# Patient Record
Sex: MALE | Race: BLACK OR AFRICAN AMERICAN | Employment: UNEMPLOYED | URBAN - METROPOLITAN AREA
[De-identification: names, ages, dates, MRNs, and addresses within clinical notes are randomized per-mention and may not be internally consistent; named-entity substitution may affect disease eponyms.]

---

## 2017-01-03 ENCOUNTER — OFFICE VISIT (OUTPATIENT)
Dept: FAMILY MEDICINE | Facility: CLINIC | Age: 67
End: 2017-01-03
Payer: COMMERCIAL

## 2017-01-03 VITALS
WEIGHT: 176.7 LBS | HEIGHT: 67 IN | TEMPERATURE: 97.3 F | OXYGEN SATURATION: 96 % | DIASTOLIC BLOOD PRESSURE: 64 MMHG | HEART RATE: 80 BPM | BODY MASS INDEX: 27.73 KG/M2 | SYSTOLIC BLOOD PRESSURE: 132 MMHG

## 2017-01-03 DIAGNOSIS — N13.9 URINARY (TRACT) OBSTRUCTION: ICD-10-CM

## 2017-01-03 DIAGNOSIS — E11.9 TYPE 2 DIABETES MELLITUS WITHOUT COMPLICATION, WITHOUT LONG-TERM CURRENT USE OF INSULIN (H): ICD-10-CM

## 2017-01-03 DIAGNOSIS — M54.41 ACUTE BILATERAL LOW BACK PAIN WITH RIGHT-SIDED SCIATICA: Primary | ICD-10-CM

## 2017-01-03 LAB
ALBUMIN UR-MCNC: NEGATIVE MG/DL
APPEARANCE UR: CLEAR
BILIRUB UR QL STRIP: NEGATIVE
COLOR UR AUTO: YELLOW
GLUCOSE UR STRIP-MCNC: NEGATIVE MG/DL
HGB UR QL STRIP: ABNORMAL
KETONES UR STRIP-MCNC: NEGATIVE MG/DL
LEUKOCYTE ESTERASE UR QL STRIP: NEGATIVE
NITRATE UR QL: NEGATIVE
PH UR STRIP: 5 PH (ref 5–7)
RBC #/AREA URNS AUTO: NORMAL /HPF (ref 0–2)
SP GR UR STRIP: >1.03 (ref 1–1.03)
URN SPEC COLLECT METH UR: ABNORMAL
UROBILINOGEN UR STRIP-ACNC: 0.2 EU/DL (ref 0.2–1)
WBC #/AREA URNS AUTO: NORMAL /HPF (ref 0–2)

## 2017-01-03 PROCEDURE — T1013 SIGN LANG/ORAL INTERPRETER: HCPCS | Mod: U3 | Performed by: FAMILY MEDICINE

## 2017-01-03 PROCEDURE — 81001 URINALYSIS AUTO W/SCOPE: CPT | Performed by: FAMILY MEDICINE

## 2017-01-03 PROCEDURE — 99214 OFFICE O/P EST MOD 30 MIN: CPT | Performed by: FAMILY MEDICINE

## 2017-01-03 RX ORDER — TAMSULOSIN HYDROCHLORIDE 0.4 MG/1
0.4 CAPSULE ORAL DAILY
Qty: 30 CAPSULE | Refills: 1 | Status: SHIPPED | OUTPATIENT
Start: 2017-01-03

## 2017-01-03 NOTE — MR AVS SNAPSHOT
After Visit Summary   1/3/2017    Abdurahman Waday    MRN: [id number]           Patient Information     Date Of Birth          1950        Visit Information        Provider Department      1/3/2017 3:15 PM Consuelo Villavicencio Lawrence Gerard, MD [facility]        Today's Diagnoses     Acute bilateral low back pain with right-sided sciatica    -  1     Urinary (tract) obstruction            Follow-ups after your visit        Additional Services     PHYSICAL THERAPY REFERRAL       patient wants to go to [facility]YS[facility]   He states that it is covered by his insurance but agrees to check with his insurance carrier to make sure that it is covered     Evaluation and treat 8 visits for Acute bilateral low back pain with right-sided sciatica  (primary encounter diagnosis)  Urinary (tract) obstruction If you have not heard from the scheduling office within 2 business days, please call [phone] for all locations, with the exception of [place], please call [phone].  Treatment: Evaluation & Treatment  Special Instructions/Modalities: evaluation and treat       Please be aware that coverage of these services is subject to the terms and limitations of your health insurance plan.  Call member services at your health plan with any benefit or coverage questions.      **Note to Provider:  If you are referring outside of [facility] for the therapy appointment, please list the name of the location in the  special instructions  above, print the referral and give to the patient to schedule the appointment.            UROLOGY ADULT REFERRAL       Your provider has referred you to: [facility] [place] for Prostate and Urologic C[facility] [phone]   [email or web address]    Please be aware that coverage of these services is subject to the terms and limitations of your health insurance plan.  Call member services  at your health plan with any benefit or coverage questions.      Please bring the following with you to your appointment:    (1) Any X-Rays, CTs or MRIs which have been performed.  Contact the facility where they were done to arrange for  prior to your scheduled appointment.    (2) List of current medications  (3) This referral request   (4) Any documents/labs given to you for this referral                  Your next 10 appointments already scheduled     Jan 16, 2017  9:00 AM   Office Visit with Lev Schwartz MD   [facility] ([facility])    [address] [place] [id number] [phone]           Bring a current list of meds and any records pertaining to this visit.  For Physicals, please bring immunization records and any forms needing to be filled out.  Please arrive 10 minutes early to complete paperwork.              Future tests that were ordered for you today     Open Future Orders        Priority Expected Expires Ordered    XR Lumbar Spine 2/3 Views Routine 1/3/2017 1/3/2018 1/3/2017            Who to contact     If you have questions or need follow up information about today's clinic visit or your schedule please contact [facility] directly at [phone].  Normal or non-critical lab and imaging results will be communicated to you by MyChart, letter or phone within 4 business days after the clinic has received the results. If you do not hear from us within 7 days, please contact the clinic through [email or web address] or phone. If you have a critical or abnormal lab result, we will notify you by phone as soon as possible.  Submit refill requests through [employer] or call your pharmacy and they will forward the refill request to us. Please allow 3 business days for your refill to be completed.          Additional Information About Your Visit        [employer] Information     [employer] lets you send messages to your doctor, view your  "test results, renew your prescriptions, schedule appointments and more. To sign up, go to [email or web address] . Click on \"Log in\" on the left side of the screen, which will take you to the Welcome page. Then click on \"Sign up Now\" on the right side of the page.     You will be asked to enter the access code listed below, as well as some personal information. Please follow the directions to create your username and password.     Your access code is: [email or web address]  Expires: 2017  9:13 AM     Your access code will  in 90 days. If you need help or a new code, please call your [facility] clinic or [phone].        Care EveryWhere ID     This is your Care EveryWhere ID. This could be used by other organizations to access your [facility] medical records  [id number]        Your Vitals Were     Pulse Temperature Height BMI (Body Mass Index) Pulse Oximetry       80 97.3  F (36.3  C) (Oral) 5' 7\" (1.702 m) 27.67 kg/m2 96%        Blood Pressure from Last 3 Encounters:   17 132/64   10/24/16 132/73   10/18/16 130/70    Weight from Last 3 Encounters:   17 176 lb 11.2 oz (80.151 kg)   10/24/16 175 lb (79.379 kg)   10/18/16 171 lb 6.4 oz (77.747 kg)              We Performed the Following     *UA reflex to Microscopic     PHYSICAL THERAPY REFERRAL     UROLOGY ADULT REFERRAL          Today's Medication Changes          These changes are accurate as of: 1/3/17  4:00 PM.  If you have any questions, ask your nurse or doctor.               These medicines have changed or have updated prescriptions.        Dose/Directions    * tamsulosin 0.4 MG capsule   Commonly known as:  FLOMAX   This may have changed:  Another medication with the same name was added. Make sure you understand how and when to take each.        Dose:  0.4 mg   Take 0.4 mg by mouth daily   Refills:  0       * tamsulosin 0.4 MG capsule   Commonly known as:  FLOMAX   This may have changed:  You were already taking a medication with the same " name, and this prescription was added. Make sure you understand how and when to take each.   Used for:  Urinary (tract) obstruction   Changed by:  Lev Schwartz MD        Dose:  0.4 mg   Take 1 capsule (0.4 mg) by mouth daily   Quantity:  30 capsule   Refills:  1       * Notice:  This list has 2 medication(s) that are the same as other medications prescribed for you. Read the directions carefully, and ask your doctor or other care provider to review them with you.         Where to get your medicines      These medications were sent to [facility] - [place], [place] - [address]  [facility] [address]     Phone:  [phone]    - tamsulosin 0.4 MG capsule             Primary Care Provider Office Phone # Fax #    Mira Fournier PA-C [phone]       [facility] [address]   [facility] [address]        Thank you!     Thank you for choosing [facility]  for your care. Our goal is always to provide you with excellent care. Hearing back from our patients is one way we can continue to improve our services. Please take a few minutes to complete the written survey that you may receive in the mail after your visit with us. Thank you!             Your Updated Medication List - Protect others around you: Learn how to safely use, store and throw away your medicines at www.disposemymeds.org.          This list is accurate as of: 1/3/17  4:00 PM.  Always use your most recent med list.                   Brand Name Dispense Instructions for use    albuterol 108 (90 BASE) MCG/ACT Inhaler    albuterol    3 Inhaler    Inhale 2 puffs into the lungs every 6 hours       aspirin 81 MG tablet     90 tablet    Take 1 tablet (81 mg) by mouth daily       benzonatate 200 MG capsule    TESSALON    21 capsule    Take 1 capsule (200 mg) by mouth 3 times daily as needed for cough       budesonide-formoterol 160-4.5 MCG/ACT Inhaler    SYMBICORT     1 Inhaler    Inhale 2 puffs into the lungs 2 times daily       cetirizine 10 MG tablet    zyrTEC     Take 10 mg by mouth daily       guaiFENesin-codeine 100-10 MG/5ML Soln solution    ROBITUSSIN AC    120 mL    Take 5-10 mLs by mouth every 6 hours as needed for cough       guaiFENesin-dextromethorphan 100-10 MG/5ML syrup    ROBITUSSIN DM    560 mL    Take 5 mLs by mouth every 4 hours as needed for cough       hydrOXYzine 50 MG capsule    VISTARIL    30 capsule    Take 1 capsule (50 mg) by mouth 3 times daily as needed for itching       isoniazid 300 MG tablet    NYDRAZID    30 tablet    Take 1 tablet (300 mg) by mouth daily       levofloxacin 750 MG tablet    LEVAQUIN    5 tablet    Take 1 tablet (750 mg) by mouth daily       metFORMIN 1000 MG tablet    GLUCOPHAGE    180 tablet    Take 1 tablet (1,000 mg) by mouth 2 times daily (with meals)       naproxen 500 MG tablet    NAPROSYN    30 tablet    Take 1 tablet (500 mg) by mouth 2 times daily as needed for moderate pain       omeprazole 20 MG tablet     90 tablet    Take 1 tablet (20 mg) by mouth daily       ranitidine 300 MG tablet    ZANTAC    30 tablet    Take 1 tablet (300 mg) by mouth At Bedtime       simvastatin 40 MG tablet    ZOCOR    90 tablet    Take 1 tablet (40 mg) by mouth At Bedtime       * tamsulosin 0.4 MG capsule    FLOMAX     Take 0.4 mg by mouth daily       * tamsulosin 0.4 MG capsule    FLOMAX    30 capsule    Take 1 capsule (0.4 mg) by mouth daily       triamcinolone 0.1 % ointment    KENALOG    15 g    Apply sparingly to affected area three times daily for 14 days.       TYLENOL PO      Take 500 mg by mouth every 6 hours as needed for mild pain or fever (1-2 tabs)       * Notice:  This list has 2 medication(s) that are the same as other medications prescribed for you. Read the directions carefully, and ask your doctor or other care provider to review them with you.

## 2017-01-03 NOTE — PROGRESS NOTES
"  SUBJECTIVE:                                                    Abdurahman Waday is a 66 year old male who presents to clinic today for the following health issues:      Back Pain      Duration: on going         Specific cause: lifting, walking    Description:   Location of pain: low back both  Character of pain: dull ache  Pain radiation:radiates into the right buttocks and radiates into the right thigh once in a while  New numbness or weakness in legs, not attributed to pain:  no    Intensity: Currently 7/10, At its worst 10/10    History:   Pain interferes with job: YES, but still does it  History of back problems: no prior back problems  Any previous MRI or X-rays: Yes- at [facility].    Sees a specialist for back pain:  No    Alleviating factors:   Improved by: heat, massage and NSAIDs      Precipitating factors:  Worsened by: Lifting, Bending, Standing and Sitting       Accompanying Signs & Symptoms:  Risk of Fracture:  None  Risk of Cauda Equina:  None  Risk of Infection:  None  Risk of Cancer:  None  Risk of Ankylosing Spondylitis:  Onset at age <35, male, AND morning back stiffness. no                    Genitourinary - Male     Onset: 6 months     Description:   Dysuria (painful urination): no   Hematuria (blood in urine): no   Frequency: YES- urgency   Are you urinating at night : YES- average 2 times   Hesitancy (delay in urine): YES   Retention (unable to empty): YES  Decrease in urinary flow: no   Incontinence: YES- some times when urgency happens    Progression of Symptoms:  worsening    Accompanying Signs & Symptoms:  Fever: no   Back/Flank pain: no   Urethral discharge: no   Testicle lumps/masses/pain: no   Nausea and/or vomiting: no   Abdominal pain: no    History:   History of frequent UTI's: no   History of kidney stones: no   History of hernias: no   Personal or Family history of Prostate problems: no  Sexually active: no     He had similar symptoms in the past \" got better with medication ( ? " "Flomax)  Refuses rectal exam             Diabetes Follow-up      Patient is checking blood sugars: not at all    Diabetic concerns: None     Symptoms of hypoglycemia (low blood sugar): none     Paresthesias (numbness or burning in feet) or sores: No             Problem list and histories reviewed & adjusted, as indicated.  Additional history: as documented    Problem list, Medication list, Allergies, and Medical/Social/Surgical histories reviewed in [facility] and updated as appropriate.    ROS:  Constitutional, HEENT, cardiovascular, pulmonary, gi and gu systems are negative, except as otherwise noted.    OBJECTIVE:                                                    /64 mmHg  Pulse 80  Temp(Src) 97.3  F (36.3  C) (Oral)  Ht 5' 7\" (1.702 m)  Wt 176 lb 11.2 oz (80.151 kg)  BMI 27.67 kg/m2  SpO2 96%  Body mass index is 27.67 kg/(m^2).  GENERAL: alert, no distress, over weight and elderly  NECK: no adenopathy, no asymmetry, masses, or scars and thyroid normal to palpation  RESP: lungs clear to auscultation - no rales, rhonchi or wheezes  CV: regular rates and rhythm, normal S1 S2, no S3 or S4 and no peripheral edema  ABDOMEN: soft, nontender, no hepatosplenomegaly, no masses and bowel sounds normal  MS: normal muscle tone and tenderness to palpation diffusely across low back  SKIN: no suspicious lesions or rashes    Diagnostic Test Results:  Results for orders placed or performed in visit on 01/03/17 (from the past 24 hour(s))   *UA reflex to Microscopic   Result Value Ref [place]    Color Urine Yellow     Appearance Urine Clear     Glucose Urine Negative NEG mg/dL    Bilirubin Urine Negative NEG    Ketones Urine Negative NEG mg/dL    Specific Gravity Urine >1.030 1.003 - 1.035    Blood Urine Small (A) NEG    pH Urine 5.0 5.0 - 7.0 pH    Protein Albumin Urine Negative NEG mg/dL    Urobilinogen Urine 0.2 0.2 - 1.0 EU/dL    Nitrite Urine Negative NEG    Leukocyte Esterase Urine Negative NEG    Source Midstream Urine  " "  Urine Microscopic   Result Value Ref [place]    WBC Urine O - 2 0 - 2 /HPF    RBC Urine O - 2 0 - 2 /HPF        ASSESSMENT/PLAN:                                                            1. Acute bilateral low back pain with right-sided sciatica  He wants to go to \"[facility]\"  Is adamant that it is covered by [facility] but agrees to first call [facility] to make sure that it is covered and agrees to go to [facility] if [facility] is not covered  - *UA reflex to Microscopic  - XR Lumbar Spine 2/3 Views; Future  - PHYSICAL THERAPY REFERRAL  - Urine Microscopic    2. Urinary (tract) obstruction  Likely BPH  Follow up with consultant as planned.   - UROLOGY ADULT REFERRAL  - tamsulosin (FLOMAX) 0.4 MG capsule; Take 1 capsule (0.4 mg) by mouth daily  Dispense: 30 capsule; Refill: 1    3. Type 2 diabetes mellitus without complication, without long-term current use of insulin (H)  Well controlled       Regular exercise  See Patient Instructions    Lev Schwartz MD  [facility]    "

## 2017-01-11 ENCOUNTER — CARE COORDINATION (OUTPATIENT)
Dept: GERIATRIC MEDICINE | Facility: CLINIC | Age: 67
End: 2017-01-11

## 2017-01-11 NOTE — PROGRESS NOTES
CM left message for client via [employer] to complete 6 month f/u.  Asked client to call me back.    WIL MarksW   [facility]   [phone]

## 2017-01-25 NOTE — PROGRESS NOTES
CM attempted to reach client for six month f/u again but VM box is full.  Asked CMS to send [facility] letter.    SAEED Marks   [facility]   [phone]

## 2017-01-26 ENCOUNTER — CARE COORDINATION (OUTPATIENT)
Dept: GERIATRIC MEDICINE | Facility: CLINIC | Age: 67
End: 2017-01-26

## 2017-02-06 ENCOUNTER — CARE COORDINATION (OUTPATIENT)
Dept: GERIATRIC MEDICINE | Facility: CLINIC | Age: 67
End: 2017-02-06

## 2017-02-06 NOTE — PROGRESS NOTES
"Received message from client stating he got [facility] letter and would like to me to call him.  CM called client through [employer]  to complete 6 month referral.    [facility] Six-Month Telephone Assessment    6 month telephone assessment completed on 2/6/17.    ER visits: Yes -  [facility] 10/24/16 for hives  Hospitalizations: No  TCU stays: No  Significant health status changes: none  Falls/Injuries: fell in Oct, had temp PCA auth for 3.5hrs/day, while recuperating which has since ended. Client was referred to PT for 6 weeks but client only completed 1 session.    ADL/IADL changes: No  Changes in services: had temp PCA auth in place 3.5hrs/day after the fall which termed in Nov    Caregiver Assessment follow up:  n/a    Goals: See POC in chart for goal progress documentation.      Per chart review and PCA agency staff, client is at baseline level of functioning.  Client is very upset that his CM (who is on medical leave currently) came to his house and \"took away all the support and money I had been getting for 8 years\" and states he is now having financial trouble.  This CM re-iterated that clients primary CM/ was following guidelines set by [employer] and that he does not qualify for EW or high PCA hours.  Client upset and states if I can't do anything to help him he does not want to speak with me.  Primary CM will see client in 6 months for an annual health risk assessment. Encouraged client to call CM with any questions or concerns in the meantime.     SAEED Marks   [facility]   [phone]          Giselle Gray MT  [facility]   [phone]    "

## 2017-02-28 ENCOUNTER — OFFICE VISIT (OUTPATIENT)
Dept: FAMILY MEDICINE | Facility: CLINIC | Age: 67
End: 2017-02-28
Payer: COMMERCIAL

## 2017-02-28 VITALS
OXYGEN SATURATION: 97 % | WEIGHT: 173.5 LBS | TEMPERATURE: 96.7 F | BODY MASS INDEX: 27.23 KG/M2 | HEART RATE: 76 BPM | SYSTOLIC BLOOD PRESSURE: 118 MMHG | DIASTOLIC BLOOD PRESSURE: 70 MMHG | HEIGHT: 67 IN

## 2017-02-28 DIAGNOSIS — Z74.1 REQUIRES DAILY ASSISTANCE FOR ACTIVITIES OF DAILY LIVING (ADL) AND COMFORT NEEDS: ICD-10-CM

## 2017-02-28 DIAGNOSIS — Z22.7 LATENT TUBERCULOSIS BY BLOOD TEST: ICD-10-CM

## 2017-02-28 DIAGNOSIS — E11.9 TYPE 2 DIABETES MELLITUS WITHOUT COMPLICATION, WITHOUT LONG-TERM CURRENT USE OF INSULIN (H): Primary | ICD-10-CM

## 2017-02-28 DIAGNOSIS — M54.41 CHRONIC BILATERAL LOW BACK PAIN WITH RIGHT-SIDED SCIATICA: ICD-10-CM

## 2017-02-28 DIAGNOSIS — G89.29 CHRONIC BILATERAL LOW BACK PAIN WITH RIGHT-SIDED SCIATICA: ICD-10-CM

## 2017-02-28 LAB — HBA1C MFR BLD: 8.5 % (ref 4.3–6)

## 2017-02-28 PROCEDURE — 82043 UR ALBUMIN QUANTITATIVE: CPT | Performed by: FAMILY MEDICINE

## 2017-02-28 PROCEDURE — 99214 OFFICE O/P EST MOD 30 MIN: CPT | Performed by: FAMILY MEDICINE

## 2017-02-28 PROCEDURE — 80061 LIPID PANEL: CPT | Performed by: FAMILY MEDICINE

## 2017-02-28 PROCEDURE — 83036 HEMOGLOBIN GLYCOSYLATED A1C: CPT | Performed by: FAMILY MEDICINE

## 2017-02-28 PROCEDURE — 80053 COMPREHEN METABOLIC PANEL: CPT | Performed by: FAMILY MEDICINE

## 2017-02-28 PROCEDURE — 36415 COLL VENOUS BLD VENIPUNCTURE: CPT | Performed by: FAMILY MEDICINE

## 2017-02-28 NOTE — NURSING NOTE
"Chief Complaint   Patient presents with     Medication Follow-up       Initial /70 (BP Location: Right arm, Patient Position: Chair, Cuff Size: Adult Large)  Pulse 76  Temp 96.7  F (35.9  C) (Oral)  Ht 5' 7\" (1.702 m)  Wt 173 lb 8 oz (78.7 kg)  SpO2 97%  BMI 27.17 kg/m2 Estimated body mass index is 27.17 kg/(m^2) as calculated from the following:    Height as of this encounter: 5' 7\" (1.702 m).    Weight as of this encounter: 173 lb 8 oz (78.7 kg).  Medication Reconciliation: complete   Angella Mejía MA      "

## 2017-02-28 NOTE — PROGRESS NOTES
".  SUBJECTIVE:                                                    Abdurahman Waday is a 67 year old male who presents to clinic today for the following health issues:      Medication Followup of Isoniazid     Taking Medication as prescribed: yes     Side Effects:  None    Medication Helping Symptoms:  Unsure      NoneDiabetes Follow-up           Diabetic concerns: None     Symptoms of hypoglycemia (low blood sugar): none     Paresthesias (numbness or burning in feet) or sores: No     Date of last diabetic eye exam: needs to do     Hyperlipidemia Follow-Up      Rate your low fat/cholesterol diet?: good    Taking statin?  Yes, no muscle aches from statin    Other lipid medications/supplements?:  none     Encounter Diagnoses   Name Primary?     Type 2 diabetes mellitus without complication, without long-term current use of insulin (H) Yes     Chronic bilateral low back pain with right-sided sciatica, using cane and \" gets worse spome days, hard to move or zeke get to Bathroom at times\"      Requires daily assistance for activities of daily living (ADL) and comfort needs. Has had a PCA a couple hours a day to help keep him independent but this stopped a few weeks ago      Latent tuberculosis by blood test         Problem list and histories reviewed & adjusted, as indicated.  Additional history: as documented    Labs reviewed in EPIC    Reviewed and updated as needed this visit by clinical staff  Tobacco       Reviewed and updated as needed this visit by Provider         ROS:  Constitutional, HEENT, cardiovascular, pulmonary, gi and gu systems are negative, except as otherwise noted.    OBJECTIVE:                                                    /70 (BP Location: Right arm, Patient Position: Chair, Cuff Size: Adult Large)  Pulse 76  Temp 96.7  F (35.9  C) (Oral)  Ht 5' 7\" (1.702 m)  Wt 173 lb 8 oz (78.7 kg)  SpO2 97%  BMI 27.17 kg/m2  Body mass index is 27.17 kg/(m^2).  GENERAL: alert, frail, elderly and " fatigued  NECK: no adenopathy, no asymmetry, masses, or scars and thyroid normal to palpation  RESP: lungs clear to auscultation - no rales, rhonchi or wheezes  CV: regular rates and rhythm, normal S1 S2, no S3 or S4 and no peripheral edema  ABDOMEN: soft, nontender, no hepatosplenomegaly, no masses and bowel sounds normal  MS: muscle wasting of legs/ moves slowly with a cane, arthritis of the back/ hips/ knees and gait slow, wide based somewhat unsteady, no ataxia  NEURO: weakness of legs and mentation intact  PSYCH: mentation appears normal, tangential and affect normal/bright  Diabetic foot exam: no trophic changes or ulcerative lesions, DP reduced bilaterally, PT reduced bilaterally and reduced sensation at distal toes    Diagnostic Test Results:   pending     ASSESSMENT/PLAN:                                                            1. Type 2 diabetes mellitus without complication, without long-term current use of insulin (H)  Recheck  Walk when able  - Comprehensive metabolic panel (BMP + Alb, Alk Phos, ALT, AST, Total. Bili, TP)  - Lipid Profile  - Hemoglobin A1c  - Albumin [place] Urine Quantitative  Follow up in 3 months.   2. Chronic bilateral low back pain with right-sided sciatica  Relapses and flares  Consider PHYSICAL THERAPY   He erasmo contact [facility] and see wht his options are    3. Requires daily assistance for activities of daily living (ADL) and comfort needs  See attached letter    4. Latent tuberculosis by blood test  Has completed 6 months, recheck LFT   continue 3 more months      See Patient Instructions    Lev Schwartz MD  [facility] [place]

## 2017-02-28 NOTE — LETTER
[facility]  [address] [place] [address]  [address]  [facility] [id number] [phone]          February 28, 2017    RE:  Abdurahman Waday                                                                                                                                                       [address]   [facility] [id number]            To whom it may concern:    Abdurahman Waday is under my professional care for    Type 2 diabetes mellitus without complication, without long-term current use of insulin (H)  Chronic bilateral low back pain with right-sided sciatica  Requires daily assistance for activities of daily living (ADL) and comfort needs  Latent tuberculosis by blood test      He needs some assistance in ADL's and would benefit from having a personal care attendant a couple hours a day           Sincerely,        Lev Schwartz MD

## 2017-02-28 NOTE — MR AVS SNAPSHOT
"              After Visit Summary   2/28/2017    Abdurahman Waday    MRN: [id number]           Patient Information     Date Of Birth          1950        Visit Information        Provider Department      2/28/2017 11:15 AM Lev Schwartz MD; [employer] [facility]        Today's Diagnoses     Type 2 diabetes mellitus without complication, without long-term current use of insulin (H)    -  1    Chronic bilateral low back pain with right-sided sciatica        Requires daily assistance for activities of daily living (ADL) and comfort needs        Latent tuberculosis by blood test           Follow-ups after your visit        Who to contact     If you have questions or need follow up information about today's clinic visit or your schedule please contact [facility] directly at [phone].  Normal or non-critical lab and imaging results will be communicated to you by MyChart, letter or phone within 4 business days after the clinic has received the results. If you do not hear from us within 7 days, please contact the clinic through MyChart or phone. If you have a critical or abnormal lab result, we will notify you by phone as soon as possible.  Submit refill requests through [employer] or call your pharmacy and they will forward the refill request to us. Please allow 3 business days for your refill to be completed.          Additional Information About Your Visit        MyChart Information     [employer] lets you send messages to your doctor, view your test results, renew your prescriptions, schedule appointments and more. To sign up, go to [email or web address] . Click on \"Log in\" on the left side of the screen, which will take you to the Welcome page. Then click on \"Sign up Now\" on the right side of the page.     You will be asked to enter the access code listed below, as well as some personal information. Please follow the directions to create your username and " "password.     Your access code is: [email or web address]  Expires: 2017 12:08 PM     Your access code will  in 90 days. If you need help or a new code, please call your [facility] or [phone].        Care EveryWhere ID     This is your Care EveryWhere ID. This could be used by other organizations to access your [place] medical records  [id number]        Your Vitals Were     Pulse Temperature Height Pulse Oximetry BMI (Body Mass Index)       76 96.7  F (35.9  C) (Oral) 5' 7\" (1.702 m) 97% 27.17 kg/m2        Blood Pressure from Last 3 Encounters:   17 118/70   17 132/64   10/24/16 132/73    Weight from Last 3 Encounters:   17 173 lb 8 oz (78.7 kg)   17 176 lb 11.2 oz (80.2 kg)   10/24/16 175 lb (79.4 kg)              We Performed the Following     Albumin Random Urine Quantitative     Comprehensive metabolic panel (BMP + Alb, Alk Phos, ALT, AST, Total. Bili, TP)     Hemoglobin A1c     Lipid Profile        Primary Care Provider Office Phone # Fax #    Mira Fournier PA-C [phone]       [facility] [address] [facility] [address]  [facility] [address]        Thank you!     Thank you for choosing [facility]  for your care. Our goal is always to provide you with excellent care. Hearing back from our patients is one way we can continue to improve our services. Please take a few minutes to complete the written survey that you may receive in the mail after your visit with us. Thank you!             Your Updated Medication List - Protect others around you: Learn how to safely use, store and throw away your medicines at www.disposemymeds.org.          This list is accurate as of: 17 12:08 PM.  Always use your most recent med list.                   Brand Name Dispense Instructions for use    albuterol 108 (90 BASE) MCG/ACT Inhaler    albuterol    3 Inhaler    Inhale 2 puffs into the lungs every 6 hours       aspirin 81 MG tablet     90 " tablet    Take 1 tablet (81 mg) by mouth daily       budesonide-formoterol 160-4.5 MCG/ACT Inhaler    SYMBICORT    1 Inhaler    Inhale 2 puffs into the lungs 2 times daily       cetirizine 10 MG tablet    zyrTEC     Take 10 mg by mouth daily       guaiFENesin-codeine 100-10 MG/5ML Soln solution    ROBITUSSIN AC    120 mL    Take 5-10 mLs by mouth every 6 hours as needed for cough       guaiFENesin-dextromethorphan 100-10 MG/5ML syrup    ROBITUSSIN DM    560 mL    Take 5 mLs by mouth every 4 hours as needed for cough       hydrOXYzine 50 MG capsule    VISTARIL    30 capsule    Take 1 capsule (50 mg) by mouth 3 times daily as needed for itching       isoniazid 300 MG tablet    NYDRAZID    30 tablet    Take 1 tablet (300 mg) by mouth daily       metFORMIN 1000 MG tablet    GLUCOPHAGE    180 tablet    Take 1 tablet (1,000 mg) by mouth 2 times daily (with meals)       naproxen 500 MG tablet    NAPROSYN    30 tablet    Take 1 tablet (500 mg) by mouth 2 times daily as needed for moderate pain       omeprazole 20 MG tablet     90 tablet    Take 1 tablet (20 mg) by mouth daily       ranitidine 300 MG tablet    ZANTAC    30 tablet    Take 1 tablet (300 mg) by mouth At Bedtime       simvastatin 40 MG tablet    ZOCOR    90 tablet    Take 1 tablet (40 mg) by mouth At Bedtime       * tamsulosin 0.4 MG capsule    FLOMAX     Take 0.4 mg by mouth daily       * tamsulosin 0.4 MG capsule    FLOMAX    30 capsule    Take 1 capsule (0.4 mg) by mouth daily       triamcinolone 0.1 % ointment    KENALOG    15 g    Apply sparingly to affected area three times daily for 14 days.       TYLENOL PO      Take 500 mg by mouth every 6 hours as needed for mild pain or fever (1-2 tabs)       * Notice:  This list has 2 medication(s) that are the same as other medications prescribed for you. Read the directions carefully, and ask your doctor or other care provider to review them with you.

## 2017-03-01 LAB
ALBUMIN SERPL-MCNC: 3.8 G/DL (ref 3.4–5)
ALP SERPL-CCNC: 111 U/L (ref 40–150)
ALT SERPL W P-5'-P-CCNC: 29 U/L (ref 0–70)
ANION GAP SERPL CALCULATED.3IONS-SCNC: 8 MMOL/L (ref 3–14)
AST SERPL W P-5'-P-CCNC: 21 U/L (ref 0–45)
BILIRUB SERPL-MCNC: 0.6 MG/DL (ref 0.2–1.3)
BUN SERPL-MCNC: 13 MG/DL (ref 7–30)
CALCIUM SERPL-MCNC: 8.9 MG/DL (ref 8.5–10.1)
CHLORIDE SERPL-SCNC: 103 MMOL/L (ref 94–109)
CHOLEST SERPL-MCNC: 189 MG/DL
CO2 SERPL-SCNC: 29 MMOL/L (ref 20–32)
CREAT SERPL-MCNC: 0.71 MG/DL (ref 0.66–1.25)
CREAT UR-MCNC: 130 MG/DL
GFR SERPL CREATININE-BSD FRML MDRD: ABNORMAL ML/MIN/1.7M2
GLUCOSE SERPL-MCNC: 134 MG/DL (ref 70–99)
HDLC SERPL-MCNC: 50 MG/DL
LDLC SERPL CALC-MCNC: 115 MG/DL
MICROALBUMIN UR-MCNC: 10 MG/L
MICROALBUMIN/CREAT UR: 7.85 MG/G CR (ref 0–17)
NONHDLC SERPL-MCNC: 139 MG/DL
POTASSIUM SERPL-SCNC: 4.3 MMOL/L (ref 3.4–5.3)
PROT SERPL-MCNC: 7.5 G/DL (ref 6.8–8.8)
SODIUM SERPL-SCNC: 140 MMOL/L (ref 133–144)
TRIGL SERPL-MCNC: 119 MG/DL

## 2017-04-03 ENCOUNTER — CARE COORDINATION (OUTPATIENT)
Dept: GERIATRIC MEDICINE | Facility: CLINIC | Age: 67
End: 2017-04-03

## 2017-04-03 NOTE — PROGRESS NOTES
"Received letter from PCP \"needs some assistance with ADLs and would benefit from having a person care attendant a couple hours a day\". Spoke with clinic nurse Anna WILSON who sent message to PCP to clarify, has there been a change in condition to warrant a new assessment.  Is currently assessed for 30min/day. Was encouraged at last assessment if he did not agree with results of assessment to follow the appeal process.  Blessing Andres RN, BSN, PHN  [facility]  [phone]  Fax: [phone]    "

## 2017-04-19 ENCOUNTER — OFFICE VISIT (OUTPATIENT)
Dept: FAMILY MEDICINE | Facility: CLINIC | Age: 67
End: 2017-04-19
Payer: COMMERCIAL

## 2017-04-19 VITALS
HEART RATE: 70 BPM | SYSTOLIC BLOOD PRESSURE: 124 MMHG | OXYGEN SATURATION: 96 % | BODY MASS INDEX: 27.31 KG/M2 | WEIGHT: 174.4 LBS | DIASTOLIC BLOOD PRESSURE: 70 MMHG | TEMPERATURE: 97.6 F

## 2017-04-19 DIAGNOSIS — K59.01 SLOW TRANSIT CONSTIPATION: ICD-10-CM

## 2017-04-19 DIAGNOSIS — J45.30 MILD PERSISTENT ASTHMA WITHOUT COMPLICATION: ICD-10-CM

## 2017-04-19 DIAGNOSIS — E11.9 TYPE 2 DIABETES MELLITUS WITHOUT COMPLICATION, WITHOUT LONG-TERM CURRENT USE OF INSULIN (H): Primary | ICD-10-CM

## 2017-04-19 DIAGNOSIS — R53.83 FATIGUE, UNSPECIFIED TYPE: ICD-10-CM

## 2017-04-19 DIAGNOSIS — Z11.59 NEED FOR HEPATITIS C SCREENING TEST: ICD-10-CM

## 2017-04-19 DIAGNOSIS — Z23 NEED FOR PROPHYLACTIC VACCINATION AND INOCULATION AGAINST CHOLERA ALONE: ICD-10-CM

## 2017-04-19 LAB — HBA1C MFR BLD: 8.3 % (ref 4.3–6)

## 2017-04-19 PROCEDURE — 99214 OFFICE O/P EST MOD 30 MIN: CPT | Performed by: NURSE PRACTITIONER

## 2017-04-19 PROCEDURE — 86803 HEPATITIS C AB TEST: CPT | Performed by: PHYSICIAN ASSISTANT

## 2017-04-19 PROCEDURE — 36415 COLL VENOUS BLD VENIPUNCTURE: CPT | Performed by: PHYSICIAN ASSISTANT

## 2017-04-19 PROCEDURE — 84443 ASSAY THYROID STIM HORMONE: CPT | Performed by: NURSE PRACTITIONER

## 2017-04-19 PROCEDURE — 83036 HEMOGLOBIN GLYCOSYLATED A1C: CPT | Performed by: NURSE PRACTITIONER

## 2017-04-19 PROCEDURE — 80061 LIPID PANEL: CPT | Performed by: PHYSICIAN ASSISTANT

## 2017-04-19 RX ORDER — SENNOSIDES A AND B 8.6 MG/1
1 TABLET, FILM COATED ORAL DAILY
Qty: 30 TABLET | Refills: 0 | Status: SHIPPED | OUTPATIENT
Start: 2017-04-19 | End: 2017-05-16

## 2017-04-19 RX ORDER — PSYLLIUM HUSK 100 %
3 POWDER (GRAM) MISCELLANEOUS 3 TIMES DAILY
Qty: 1000 G | Refills: 1 | Status: SHIPPED | OUTPATIENT
Start: 2017-04-19

## 2017-04-19 NOTE — Clinical Note
Fave second line DM for elderly?  Maxed on metformin.  Only need a little reduction and want very few side effects.  Ok with no weight loss in elderly.

## 2017-04-19 NOTE — MR AVS SNAPSHOT
After Visit Summary   4/19/2017    Abdurahman Waday    MRN: [id number]           Patient Information     Date Of Birth          1950        Visit Information        Provider Department      4/19/2017 11:45 AM Alejandra Murillo; Nicki Fischer APRN [facility]        Today's Diagnoses     Type 2 diabetes mellitus without complication, without long-term current use of insulin (H)    -  1    Mild persistent asthma without complication        Fatigue, unspecified type        Need for hepatitis C screening test        Need for prophylactic vaccination and inoculation against cholera alone        Slow transit constipation          Care Instructions    Return in one month to check on diabetes and constipation and Shingles shot  Bring glucose records     Drink 80 oz water every day  Fill up the water bottles or glasses for the whole day so you are reminded to drink the water even when you are not thirsty  Can take the pill for constipation for 5 days  Use the powder everyday for at least one month    Treating constipation:   Increase daily exercise to 30-60 min/day   Increase fluids with out caffeine to 8-10 glasses/day   Increase daily fiber - It is recommended to get 25-38 grams of fiber daily   Recommended fiber options to add to your diet:    Ground flax seed meal            1-2 TBS/day    Psyllium seed husk     1-2 TBS/day (Metamucil, Citrucel)   Citrucel and others      Consider decreasing or avoiding dairy, bananas, and white rice              Consider taking probiotics- capsules consisting of 2-4 types of bacteria and                medium priced.  Take daily for at least month.    Getting fiber in your diet  A great way to get enough fiber is to be sure to get two cups of fruit and two and a half cups of vegetables and three to five servings of whole grains each day.    Go slow when increasing the fiber in your diet because too much fiber at once and cause symptoms of gas,  diarrhea, cramps and bloating.  These symptoms usually resolve when your body is more used to a high fiber diet.    Health benefits of fiber - more than just for constipation   Fiber is the substance in plant food that passes through the body undigested.  Speeds intestinal transit time to protect against cancer and constipation   Improves digestive health   Lowers cholesterol and cardiovascular disease   Reduces inflammation and boosts immune function   Increases bone strength and blood glucose control    Dietary sources of fiber  Soluble fiber partially dissolves in water to form a gel like substance.  Examples:   Oatmeal, oat bran, nuts, seeds, beans,legumes, apples, pears, prunes  Insoluble fiber does not dissolve in water.  Examples:   Whole grains with wheat bran, corn bran, flaxseeds, and also some   Vegetables/fruits especially with skins.  Prebiotics are nondigestible substances that stimulate healthy bacteria(probiotics) growth in the intestines. These are now often added to foods.  Examples:   Inulin(from chicory root),whole grains, onions, garlic, leeks,honey, and some fruits           Follow-ups after your visit        Who to contact     If you have questions or need follow up information about today's clinic visit or your schedule please contact [facility] directly at [phone].  Normal or non-critical lab and imaging results will be communicated to you by MyChart, letter or phone within 4 business days after the clinic has received the results. If you do not hear from us within 7 days, please contact the clinic through MyChart or phone. If you have a critical or abnormal lab result, we will notify you by phone as soon as possible.  Submit refill requests through [employer] or call your pharmacy and they will forward the refill request to us. Please allow 3 business days for your refill to be completed.          Additional Information About Your Visit        MyChart Information      "[employer] lets you send messages to your doctor, view your test results, renew your prescriptions, schedule appointments and more. To sign up, go to [email or web address] . Click on \"Log in\" on the left side of the screen, which will take you to the Welcome page. Then click on \"Sign up Now\" on the right side of the page.     You will be asked to enter the access code listed below, as well as some personal information. Please follow the directions to create your username and password.     Your access code is: [email or web address]  Expires: 2017  1:08 PM     Your access code will  in 90 days. If you need help or a new code, please call your [place] clinic or [phone].        Care EveryWhere ID     This is your Care EveryWhere ID. This could be used by other organizations to access your [place] medical records  [id number]        Your Vitals Were     Pulse Temperature Pulse Oximetry BMI (Body Mass Index)          70 97.6  F (36.4  C) (Oral) 96% 27.31 kg/m2         Blood Pressure from Last 3 Encounters:   17 124/70   17 118/70   17 132/64    Weight from Last 3 Encounters:   17 174 lb 6.4 oz (79.1 kg)   17 173 lb 8 oz (78.7 kg)   17 176 lb 11.2 oz (80.2 kg)              We Performed the Following     Asthma Action Plan (AAP)     Hemoglobin A1c     Hepatitis C antibody     Lipid Profile with reflex to direct LDL     TSH with free T4 reflex          Today's Medication Changes          These changes are accurate as of: 17 12:43 PM.  If you have any questions, ask your nurse or doctor.               Start taking these medicines.        Dose/Directions    blood glucose monitoring meter device kit   Commonly known as:  no brand specified   Used for:  Type 2 diabetes mellitus without complication, without long-term current use of insulin (H)   Started by:  Nicki Fischer APRN CNP        Use to test blood sugar 4 times daily or as directed.   Quantity:  1 kit   Refills:  0    "    Psyllium Husk Powd   Used for:  Slow transit constipation   Started by:  Nicki Fischre APRN CNP        Dose:  3 tsp.   3 tsp. 3 times daily   Quantity:  1000 g   Refills:  1       senna 8.6 MG tablet   Commonly known as:  SENOKOT   Used for:  Slow transit constipation   Started by:  Nicki Fischer APRN CNP        Dose:  1 tablet   Take 1 tablet by mouth daily   Quantity:  30 tablet   Refills:  0         These medicines have changed or have updated prescriptions.        Dose/Directions    tamsulosin 0.4 MG capsule   Commonly known as:  FLOMAX   This may have changed:  Another medication with the same name was removed. Continue taking this medication, and follow the directions you see here.   Used for:  Urinary (tract) obstruction   Changed by:  Lev Schwartz MD        Dose:  0.4 mg   Take 1 capsule (0.4 mg) by mouth daily   Quantity:  30 capsule   Refills:  1         Stop taking these medicines if you haven't already. Please contact your care team if you have questions.     cetirizine 10 MG tablet   Commonly known as:  zyrTEC   Stopped by:  Nicki Fischer APRN CNP           guaiFENesin-codeine 100-10 MG/5ML Soln solution   Commonly known as:  ROBITUSSIN AC   Stopped by:  Nicki Fischer APRN CNP           guaiFENesin-dextromethorphan 100-10 MG/5ML syrup   Commonly known as:  ROBITUSSIN DM   Stopped by:  Nicki Fischer APRN CNP                Where to get your medicines      These medications were sent to [facility] - [place], [place] - [address]  [address] [facility] [address]     Phone:  [phone]     blood glucose monitoring meter device kit    Psyllium Husk Powd    senna 8.6 MG tablet                Primary Care Provider Office Phone # Fax #    Mira Fournier PA-C [phone]       [facility] [address]   [facility] [address]        Thank you!     Thank you for choosing [facility]  for your care. Our  goal is always to provide you with excellent care. Hearing back from our patients is one way we can continue to improve our services. Please take a few minutes to complete the written survey that you may receive in the mail after your visit with us. Thank you!             Your Updated Medication List - Protect others around you: Learn how to safely use, store and throw away your medicines at www.disposemymeds.org.          This list is accurate as of: 4/19/17 12:43 PM.  Always use your most recent med list.                   Brand Name Dispense Instructions for use    albuterol 108 (90 BASE) MCG/ACT Inhaler    albuterol    3 Inhaler    Inhale 2 puffs into the lungs every 6 hours       aspirin 81 MG tablet     90 tablet    Take 1 tablet (81 mg) by mouth daily       blood glucose monitoring meter device kit    no brand specified    1 kit    Use to test blood sugar 4 times daily or as directed.       budesonide-formoterol 160-4.5 MCG/ACT Inhaler    SYMBICORT    1 Inhaler    Inhale 2 puffs into the lungs 2 times daily       hydrOXYzine 50 MG capsule    VISTARIL    30 capsule    Take 1 capsule (50 mg) by mouth 3 times daily as needed for itching       isoniazid 300 MG tablet    NYDRAZID    30 tablet    Take 1 tablet (300 mg) by mouth daily       metFORMIN 1000 MG tablet    GLUCOPHAGE    180 tablet    Take 1 tablet (1,000 mg) by mouth 2 times daily (with meals)       naproxen 500 MG tablet    NAPROSYN    30 tablet    Take 1 tablet (500 mg) by mouth 2 times daily as needed for moderate pain       omeprazole 20 MG tablet     90 tablet    Take 1 tablet (20 mg) by mouth daily       Psyllium Husk Powd     1000 g    3 tsp. 3 times daily       ranitidine 300 MG tablet    ZANTAC    30 tablet    Take 1 tablet (300 mg) by mouth At Bedtime       senna 8.6 MG tablet    SENOKOT    30 tablet    Take 1 tablet by mouth daily       simvastatin 40 MG tablet    ZOCOR    90 tablet    Take 1 tablet (40 mg) by mouth At Bedtime       tamsulosin  0.4 MG capsule    FLOMAX    30 capsule    Take 1 capsule (0.4 mg) by mouth daily       triamcinolone 0.1 % ointment    KENALOG    15 g    Apply sparingly to affected area three times daily for 14 days.       TYLENOL PO      Take 500 mg by mouth every 6 hours as needed for mild pain or fever (1-2 tabs)

## 2017-04-19 NOTE — LETTER
[facility]  [address] [place] [address]  [address]  [facility] [id number] [phone]        April 24, 2017      Cameliaave Waday  [address]    [facility] [address]          Dear . Waday,    The results of your recent lab tests were within normal limits. Enclosed is a copy of these results.  If you have any further questions or problems, please contact our office.    Sincerely,        LISA Canchola        Results for orders placed or performed in visit on 04/19/17   Hemoglobin A1c   Result Value Ref Range    Hemoglobin A1C 8.3 (H) 4.3 - 6.0 %   TSH with free T4 reflex   Result Value Ref Range    TSH 0.55 0.40 - 4.00 mU/L   Hepatitis C antibody   Result Value Ref Range    Hepatitis C Antibody  NR     Nonreactive   Assay performance characteristics have not been established for newborns,   infants, and children     Lipid Profile with reflex to direct LDL   Result Value Ref Range    Cholesterol 177 <200 mg/dL    Triglycerides 147 <150 mg/dL    HDL Cholesterol 42 >39 mg/dL    LDL Cholesterol Calculated 106 (H) <100 mg/dL    Non HDL Cholesterol 135 (H) <130 mg/dL

## 2017-04-19 NOTE — PATIENT INSTRUCTIONS
Return in one month to check on diabetes and constipation and Shingles shot  Bring glucose records     Drink 80 oz water every day  Fill up the water bottles or glasses for the whole day so you are reminded to drink the water even when you are not thirsty  Can take the pill for constipation for 5 days  Use the powder everyday for at least one month    Treating constipation:   Increase daily exercise to 30-60 min/day   Increase fluids with out caffeine to 8-10 glasses/day   Increase daily fiber - It is recommended to get 25-38 grams of fiber daily   Recommended fiber options to add to your diet:    Ground flax seed meal            1-2 TBS/day    Psyllium seed husk     1-2 TBS/day (Metamucil, Citrucel)   Citrucel and others      Consider decreasing or avoiding dairy, bananas, and white rice              Consider taking probiotics- capsules consisting of 2-4 types of bacteria and                medium priced.  Take daily for at least month.    Getting fiber in your diet  A great way to get enough fiber is to be sure to get two cups of fruit and two and a half cups of vegetables and three to five servings of whole grains each day.    Go slow when increasing the fiber in your diet because too much fiber at once and cause symptoms of gas, diarrhea, cramps and bloating.  These symptoms usually resolve when your body is more used to a high fiber diet.    Health benefits of fiber - more than just for constipation   Fiber is the substance in plant food that passes through the body undigested.  Speeds intestinal transit time to protect against cancer and constipation   Improves digestive health   Lowers cholesterol and cardiovascular disease   Reduces inflammation and boosts immune function   Increases bone strength and blood glucose control    Dietary sources of fiber  Soluble fiber partially dissolves in water to form a gel like substance.  Examples:   Oatmeal, oat bran, nuts, seeds, beans,legumes, apples, pears,  prunes  Insoluble fiber does not dissolve in water.  Examples:   Whole grains with wheat bran, corn bran, flaxseeds, and also some   Vegetables/fruits especially with skins.  Prebiotics are nondigestible substances that stimulate healthy bacteria(probiotics) growth in the intestines. These are now often added to foods.  Examples:   Inulin(from chicory root),whole grains, onions, garlic, leeks,honey, and some fruits

## 2017-04-19 NOTE — PROGRESS NOTES
SUBJECTIVE:                                                    Abdurahman Waday is a 67 year old male who presents to clinic today for the following health issues:      Diabetes Follow-up      Patient is checking blood sugars: Lost meter about 1-2 weeks ago but usually check it 2-3 times a day usually between 170-180    Diabetic concerns: Lost glucometer and needs replacement    Constipated for this past month and would like laxative.  Bowel movement every other day.  No blood in the stool.  Drinks one glass of water per day.     Symptoms of hypoglycemia (low blood sugar): none     Paresthesias (numbness or burning in feet) or sores: No     Date of last diabetic eye exam: 4 months ago     Last medication change:  None    Lab Results   Component Value Date    A1C 8.5 02/28/2017    A1C 7.8 09/15/2016    A1C 7.7 04/19/2016    A1C 7.2 05/12/2015     Wt Readings from Last 5 Encounters:   04/19/17 174 lb 6.4 oz (79.1 kg)   02/28/17 173 lb 8 oz (78.7 kg)   01/03/17 176 lb 11.2 oz (80.2 kg)   10/24/16 175 lb (79.4 kg)   10/18/16 171 lb 6.4 oz (77.7 kg)          Amount of exercise or physical activity: 6-7 days/week for an average of greater than 60 minutes    Problems taking medications regularly: No    Medication side effects: none    Diet: diabetic    Fatigue     Onset was approximately 3 day(s) ago. Just came back from out of town.  Symptoms have been stable.    Accompanying signs and symptoms: None.   Precipitating or alleviating factors: None  Therapies tried and outcome: None  usually does not feel an increased need to sleep.  Bedtime is 11 o'clock.  Asleep within 10-15 minuets.  usually does not note nighttime wakening.  Notes the following sleep apnea symptoms fatigue and periodic leg movement.  Wakes at around 8 a.m.  Total sleep time is 7-8.  Wakes feeling rested.  does not nap during day.  has not noted hypersomnolence.    Current stressors include: none.          Problem list and histories reviewed & adjusted,  as indicated.  Additional history: as documented    Patient Active Problem List   Diagnosis     Bilateral low back pain without sciatica     Hip pain, right     Type 2 diabetes mellitus (H)     Health Care Home     CARDIOVASCULAR SCREENING; LDL GOAL LESS THAN 100     Advanced directives, counseling/discussion     Bilateral low back pain with right-sided sciatica     Mild persistent asthma without complication     Type 2 diabetes mellitus without complication, without long-term current use of insulin (H)     History reviewed. No pertinent surgical history.    Social History   Substance Use Topics     Smoking status: Former Smoker     Smokeless tobacco: Never Used      Comment: quit ten years ago     Alcohol use No     Family History   Problem Relation Age of Onset     Glaucoma No family hx of      Macular Degeneration No family hx of          Current Outpatient Prescriptions   Medication Sig Dispense Refill     budesonide-formoterol (SYMBICORT) 160-4.5 MCG/ACT Inhaler Inhale 2 puffs into the lungs 2 times daily 1 Inhaler 3     tamsulosin (FLOMAX) 0.4 MG capsule Take 1 capsule (0.4 mg) by mouth daily 30 capsule 1     hydrOXYzine (VISTARIL) 50 MG capsule Take 1 capsule (50 mg) by mouth 3 times daily as needed for itching 30 capsule 0     triamcinolone (KENALOG) 0.1 % ointment Apply sparingly to affected area three times daily for 14 days. 15 g 0     omeprazole 20 MG tablet Take 1 tablet (20 mg) by mouth daily 90 tablet 3     albuterol (ALBUTEROL) 108 (90 BASE) MCG/ACT inhaler Inhale 2 puffs into the lungs every 6 hours 3 Inhaler 3     metFORMIN (GLUCOPHAGE) 1000 MG tablet Take 1 tablet (1,000 mg) by mouth 2 times daily (with meals) 180 tablet 3     aspirin 81 MG tablet Take 1 tablet (81 mg) by mouth daily 90 tablet 3     simvastatin (ZOCOR) 40 MG tablet Take 1 tablet (40 mg) by mouth At Bedtime 90 tablet 3     isoniazid (NYDRAZID) 300 MG tablet Take 1 tablet (300 mg) by mouth daily 30 tablet 8     naproxen (NAPROSYN)  500 MG tablet Take 1 tablet (500 mg) by mouth 2 times daily as needed for moderate pain 30 tablet 1     Acetaminophen (TYLENOL PO) Take 500 mg by mouth every 6 hours as needed for mild pain or fever (1-2 tabs)       guaiFENesin-codeine (ROBITUSSIN AC) 100-10 MG/5ML SOLN Take 5-10 mLs by mouth every 6 hours as needed for cough 120 mL 0     guaiFENesin-dextromethorphan (ROBITUSSIN DM) 100-10 MG/5ML syrup Take 5 mLs by mouth every 4 hours as needed for cough 560 mL 1     tamsulosin (FLOMAX) 0.4 MG 24 hr capsule Take 0.4 mg by mouth daily       ranitidine (ZANTAC) 300 MG tablet Take 1 tablet (300 mg) by mouth At Bedtime 30 tablet 0     cetirizine (ZYRTEC) 10 MG tablet Take 10 mg by mouth daily       BP Readings from Last 3 Encounters:   04/19/17 124/70   02/28/17 118/70   01/03/17 132/64    Wt Readings from Last 3 Encounters:   04/19/17 174 lb 6.4 oz (79.1 kg)   02/28/17 173 lb 8 oz (78.7 kg)   01/03/17 176 lb 11.2 oz (80.2 kg)                    Reviewed and updated as needed this visit by clinical staff  Tobacco  Allergies  Meds  Med Hx  Surg Hx  Fam Hx  Soc Hx      Reviewed and updated as needed this visit by Provider         ROS:  Constitutional, HEENT, cardiovascular, pulmonary, gi and gu systems are negative, except as otherwise noted.    OBJECTIVE:                                                    /70 (BP Location: Left arm, Patient Position: Chair, Cuff Size: Adult Regular)  Pulse 70  Temp 97.6  F (36.4  C) (Oral)  Wt 174 lb 6.4 oz (79.1 kg)  SpO2 96%  BMI 27.31 kg/m2  Body mass index is 27.31 kg/(m^2).  GENERAL: healthy, alert and no distress  EYES: Eyes grossly normal to inspection, PERRL and conjunctivae and sclerae normal  HENT: ear canals and TM's normal, nose and mouth without ulcers or lesions  NECK: no adenopathy, no asymmetry, masses, or scars and thyroid normal to palpation  RESP: lungs clear to auscultation - no rales, rhonchi or wheezes  CV: regular rate and rhythm, normal S1 S2, no  S3 or S4, no murmur, click or rub, no peripheral edema and peripheral pulses strong  ABDOMEN: no organomegaly or masses, liver span normal to percussion, bowel sounds normal and generalized tenderness  MS: no gross musculoskeletal defects noted, no edema  SKIN: no suspicious lesions or rashes  NEURO: Normal strength and tone, mentation intact and speech normal  PSYCH: mentation appears normal, affect normal/bright    Diagnostic Test Results:  Results for orders placed or performed in visit on 04/19/17   Hemoglobin A1c   Result Value Ref Range    Hemoglobin A1C 8.3 (H) 4.3 - 6.0 %        ASSESSMENT/PLAN:                                                    Diabetes Type II, A1c Uncontrolled, non-insulin dependent   Associated with the following complications    Renal Complications:  None    Ophthalmologic Complications: None    Neurologic Complications: None    Peripheral Vascular Complications:  None    Other: None   Plan:  Medications:  Add medication - discuss with MTM  Reordered glucometer      (E11.9) Type 2 diabetes mellitus without complication, without long-term current use of insulin (H)  (primary encounter diagnosis)  Comment:   Plan: Hemoglobin A1c, blood glucose monitoring (NO         BRAND SPECIFIED) meter device kit     Return in one month with detailed glucose readings for one week.    (J45.30) Mild persistent asthma without complication  Comment:   Plan: Asthma Action Plan (AAP)            (R53.83) Fatigue, unspecified type  Comment:   Plan: TSH with free T4 reflex            (K59.01) Slow transit constipation  Comment:   Plan: senna (SENOKOT) 8.6 MG tablet, Psyllium Husk         POWD   Advised against laxatives.  Short term use senna and more focus on fiber, hydration and exercise      Patient Instructions   Return in one month to check on diabetes and constipation and Shingles shot  Bring glucose records     Drink 80 oz water every day  Fill up the water bottles or glasses for the whole day so you are  reminded to drink the water even when you are not thirsty  Can take the pill for constipation for 5 days  Use the powder everyday for at least one month    Treating constipation:   Increase daily exercise to 30-60 min/day   Increase fluids with out caffeine to 8-10 glasses/day   Increase daily fiber - It is recommended to get 25-38 grams of fiber daily   Recommended fiber options to add to your diet:    Ground flax seed meal            1-2 TBS/day    Psyllium seed husk     1-2 TBS/day (Metamucil, Citrucel)   Citrucel and others      Consider decreasing or avoiding dairy, bananas, and white rice              Consider taking probiotics- capsules consisting of 2-4 types of bacteria and                medium priced.  Take daily for at least month.    Getting fiber in your diet  A great way to get enough fiber is to be sure to get two cups of fruit and two and a half cups of vegetables and three to five servings of whole grains each day.    Go slow when increasing the fiber in your diet because too much fiber at once and cause symptoms of gas, diarrhea, cramps and bloating.  These symptoms usually resolve when your body is more used to a high fiber diet.    Health benefits of fiber - more than just for constipation   Fiber is the substance in plant food that passes through the body undigested.  Speeds intestinal transit time to protect against cancer and constipation   Improves digestive health   Lowers cholesterol and cardiovascular disease   Reduces inflammation and boosts immune function   Increases bone strength and blood glucose control    Dietary sources of fiber  Soluble fiber partially dissolves in water to form a gel like substance.  Examples:   Oatmeal, oat bran, nuts, seeds, beans,legumes, apples, pears, prunes  Insoluble fiber does not dissolve in water.  Examples:   Whole grains with wheat bran, corn bran, flaxseeds, and also some   Vegetables/fruits especially with skins.  Prebiotics are nondigestible  substances that stimulate healthy bacteria(probiotics) growth in the intestines. These are now often added to foods.  Examples:   Inulin(from chicory root),whole grains, onions, garlic, leeks,honey, and some fruits         PAM Thompson [facility]

## 2017-04-19 NOTE — NURSING NOTE
"Chief Complaint   Patient presents with     Diabetes       Initial /70 (BP Location: Left arm, Patient Position: Chair, Cuff Size: Adult Regular)  Pulse 70  Temp 97.6  F (36.4  C) (Oral)  Wt 174 lb 6.4 oz (79.1 kg)  SpO2 96%  BMI 27.31 kg/m2 Estimated body mass index is 27.31 kg/(m^2) as calculated from the following:    Height as of 2/28/17: 5' 7\" (1.702 m).    Weight as of this encounter: 174 lb 6.4 oz (79.1 kg).  Medication Reconciliation: complete     Andrew Samuels MA      "

## 2017-04-19 NOTE — LETTER
My Asthma Action Plan  Name: Abdurahman Waday   YOB: 1950  Date: 4/19/2017   My doctor: PAM Thompson CNP   [place]: [facility] [place]        My Control Medicine: Budesonide + formoterol (Symbicort) -  80/4.5 mcg BID  My Rescue Medicine: Albuterol nebulizer solution PRN  Albuterol (Proair/Ventolin/Proventil) inhaler PRN   My Asthma Severity: mild persistent  Avoid your asthma triggers: cold air               GREEN ZONE     Good Control    I feel good    No cough or wheeze    Can work, sleep and play without asthma symptoms       Take your asthma control medicine every day.     1. If exercise triggers your asthma, take your rescue medication    15 minutes before exercise or sports, and    During exercise if you have asthma symptoms  2. Spacer to use with inhaler: If you have a spacer, make sure to use it with your inhaler             YELLOW ZONE     Getting Worse  I have ANY of these:    I do not feel good    Cough or wheeze    Chest feels tight    Wake up at night   1. Keep taking your Green Zone medications  2. Start taking your rescue medicine:    every 20 minutes for up to 1 hour. Then every 4 hours for 24-48 hours.  3. If you stay in the Yellow Zone for more than 12-24 hours, contact your doctor.  4. If you do not return to the Green Zone in 12-24 hours or you get worse, start taking your oral steroid medicine if prescribed by your provider.           RED ZONE     Medical Alert - Get Help  I have ANY of these:    I feel awful    Medicine is not helping    Breathing getting harder    Trouble walking or talking    Nose opens wide to breathe       1. Take your rescue medicine NOW  2. If your provider has prescribed an oral steroid medicine, start taking it NOW  3. Call your doctor NOW  4. If you are still in the Red Zone after 20 minutes and you have not reached your doctor:    Take your rescue medicine again and    Call 911 or go to the emergency room right away    See your  regular doctor within 2 weeks of an Emergency Room or Urgent Care visit for follow-up treatment.        Electronically signed by: Nicki Fischer, April 19, 2017    Annual Reminders:  Meet with Asthma Educator,  Flu Shot in the Fall, consider Pneumonia Vaccination for patients with asthma (aged 19 and older).    Pharmacy:    [facility] - [place], [place] - [address]                    Asthma Triggers  How To Control Things That Make Your Asthma Worse    Triggers are things that make your asthma worse.  Look at the list below to help you find your triggers and what you can do about them.  You can help prevent asthma flare-ups by staying away from your triggers.      Trigger                                                          What you can do   Cigarette Smoke  Tobacco smoke can make asthma worse. Do not allow smoking in your home, car or around you.  Be sure no one smokes at a child s day care or school.  If you smoke, ask your health care provider for ways to help you quit.  Ask family members to quit too.  Ask your health care provider for a referral to Quit Plan to help you quit smoking, or call 3-175-049-PLAN.     Colds, Flu, Bronchitis  These are common triggers of asthma. Wash your hands often.  Don t touch your eyes, nose or mouth.  Get a flu shot every year.     Dust Mites  These are tiny bugs that live in cloth or carpet. They are too small to see. Wash sheets and blankets in hot water every week.   Encase pillows and mattress in dust mite proof covers.  Avoid having carpet if you can. If you have carpet, vacuum weekly.   Use a dust mask and HEPA vacuum.   Pollen and Outdoor Mold  Some people are allergic to trees, grass, or weed pollen, or molds. Try to keep your windows closed.  Limit time out doors when pollen count is high.   Ask you health care provider about taking medicine during allergy season.     Animal Dander  Some people are allergic to skin flakes,  urine or saliva from pets with fur or feathers. Keep pets with fur or feathers out of your home.    If you can t keep the pet outdoors, then keep the pet out of your bedroom.  Keep the bedroom door closed.  Keep pets off cloth furniture and away from stuffed toys.     Mice, Rats, and Cockroaches  Some people are allergic to the waste from these pests.   Cover food and garbage.  Clean up spills and food crumbs.  Store grease in the refrigerator.   Keep food out of the bedroom.   Indoor Mold  This can be a trigger if your home has high moisture. Fix leaking faucets, pipes, or other sources of water.   Clean moldy surfaces.  Dehumidify basement if it is damp and smelly.   Smoke, Strong Odors, and Sprays  These can reduce air quality. Stay away from strong odors and sprays, such as perfume, powder, hair spray, paints, smoke incense, paint, cleaning products, candles and new carpet.   Exercise or Sports  Some people with asthma have this trigger. Be active!  Ask your doctor about taking medicine before sports or exercise to prevent symptoms.    Warm up for 5-10 minutes before and after sports or exercise.     Other Triggers of Asthma  Cold air:  Cover your nose and mouth with a scarf.  Sometimes laughing or crying can be a trigger.  Some medicines and food can trigger asthma.

## 2017-04-20 LAB
CHOLEST SERPL-MCNC: 177 MG/DL
HCV AB SERPL QL IA: NORMAL
HDLC SERPL-MCNC: 42 MG/DL
LDLC SERPL CALC-MCNC: 106 MG/DL
NONHDLC SERPL-MCNC: 135 MG/DL
TRIGL SERPL-MCNC: 147 MG/DL
TSH SERPL DL<=0.005 MIU/L-ACNC: 0.55 MU/L (ref 0.4–4)

## 2017-04-20 ASSESSMENT — ASTHMA QUESTIONNAIRES: ACT_TOTALSCORE: 22

## 2017-04-21 NOTE — PROGRESS NOTES
"Please notify patient of normal lab results.  Thank you.    Please add below note.   Enclosed is a copy of your recent results.  If you have any further questions or problems, please contact our office at [phone].\"
"[phone]"

## 2017-04-27 ENCOUNTER — TELEPHONE (OUTPATIENT)
Dept: FAMILY MEDICINE | Facility: CLINIC | Age: 67
End: 2017-04-27

## 2017-04-27 DIAGNOSIS — E11.9 TYPE 2 DIABETES MELLITUS WITHOUT COMPLICATION, WITHOUT LONG-TERM CURRENT USE OF INSULIN (H): Primary | ICD-10-CM

## 2017-04-27 NOTE — TELEPHONE ENCOUNTER
Telephone call to patient, unable to leave a message.     Anna Moreira RN  [facility]

## 2017-04-27 NOTE — LETTER
[facility] [place]  [address] [place] [address] [address]  [facility] [id number] [phone]      April 28, 2017      Abbey Waday  [address] [facility] [address]  [place] [address]            Dear Abbey,      Your provider, Eva Fischer CNP would like you to start a small dose of a second medication called Januvia. Please start this medication before you return for your clinic follow up on May 24th at 10:00 am.  This is a category of medication that is a daily pill, has few side effects and will not cause you to gain or lose weight. This medication was sent to your preferred pharmacy of [facility]. If you prefer another pharmacy, please call us at [phone] with your preferred pharmacy information.     If this specific medication is not covered by your insurance, please have your pharmacy call us or you can also call us directly.     Please start taking the Januvia in addition to your Metformin. If you have any questions at all, please call us at [phone].       Sincerely,        LISA Canchola

## 2017-04-27 NOTE — TELEPHONE ENCOUNTER
"I consulted with pharmacist about best second diabetes medications and I'd like patient to start a small dose of a second medication - Januvia - before he returns for follow-up in May.  This is a category of medication that is a daily pill, has few side effects and does not cause patient to gain or lose weight.  I have sent it to the listed preferred pharmacy of [facility].  If he wants another pharmacy, I can rewrite.  If this specific medication is not covered, then have him please ask pharmacy which \"DPP-4\" his plan covers and I will change it.  This is in addition to his metformin, which he will continue.  I do want him to start this before his 5/24 visit.  If he has any questions at all, please find them out and I can answer those OR he can always see the pharmacist for an appointment as well.  LISA Canchola    "

## 2017-05-01 ENCOUNTER — OFFICE VISIT (OUTPATIENT)
Dept: FAMILY MEDICINE | Facility: CLINIC | Age: 67
End: 2017-05-01
Payer: COMMERCIAL

## 2017-05-01 VITALS
TEMPERATURE: 97 F | HEART RATE: 80 BPM | SYSTOLIC BLOOD PRESSURE: 136 MMHG | WEIGHT: 174.8 LBS | OXYGEN SATURATION: 98 % | DIASTOLIC BLOOD PRESSURE: 60 MMHG | BODY MASS INDEX: 27.38 KG/M2

## 2017-05-01 DIAGNOSIS — R05.9 COUGH: ICD-10-CM

## 2017-05-01 DIAGNOSIS — J45.31 MILD PERSISTENT ASTHMA WITH ACUTE EXACERBATION: Primary | ICD-10-CM

## 2017-05-01 PROCEDURE — 94640 AIRWAY INHALATION TREATMENT: CPT | Performed by: PHYSICIAN ASSISTANT

## 2017-05-01 PROCEDURE — 99214 OFFICE O/P EST MOD 30 MIN: CPT | Mod: 25 | Performed by: PHYSICIAN ASSISTANT

## 2017-05-01 RX ORDER — BENZONATATE 200 MG/1
200 CAPSULE ORAL 3 TIMES DAILY PRN
Qty: 21 CAPSULE | Refills: 0 | Status: SHIPPED | OUTPATIENT
Start: 2017-05-01 | End: 2017-05-24

## 2017-05-01 NOTE — PROGRESS NOTES
SUBJECTIVE:                                                    Abdurahman Waday is a 67 year old male who presents to clinic today for the following health issues:    Acute Illness   Acute illness concerns: Cold  Onset: 3 days    Fever: no    Chills/Sweats: no    Headache (location?): no     Sinus Pressure:no    Conjunctivitis:  Yesterday not today    Ear Pain: no    Rhinorrhea: no, but last 2 days    Congestion: no    Sore Throat: no      Cough: YES    Wheeze: no     Decreased Appetite: no     Nausea: no    Vomiting: no    Diarrhea:  no    Dysuria/Freq.: no    Fatigue/Achiness: no    Sick/Strep Exposure: no     Therapies Tried and outcome: nothing     Has had asthma before, recently at his apartment it was treated with chemicals, has been coughing since    Reports he's using his symbicort 2 puffs three times daily. He's not using albuterol right now because it didn't work (last time he used it was 1 year ago)          Problem list and histories reviewed & adjusted, as indicated.  Additional history: as documented    ROS:  CV: NEGATIVE for chest pain, palpitations or peripheral edema  GI: NEGATIVE for nausea, abdominal pain, heartburn, or change in bowel habits  Skin: NEGATIVE: rash    Patient Active Problem List   Diagnosis     Bilateral low back pain without sciatica     Hip pain, right     Type 2 diabetes mellitus (H)     Health Care Home     CARDIOVASCULAR SCREENING; LDL GOAL LESS THAN 100     Advanced directives, counseling/discussion     Bilateral low back pain with right-sided sciatica     Mild persistent asthma without complication     Type 2 diabetes mellitus without complication, without long-term current use of insulin (H)     No past surgical history on file.    Social History   Substance Use Topics     Smoking status: Former Smoker     Smokeless tobacco: Never Used      Comment: quit ten years ago     Alcohol use No     Family History   Problem Relation Age of Onset     Glaucoma No family hx of       Macular Degeneration No family hx of            Problem list, Medication list, Allergies, and Medical/Social/Surgical histories reviewed in [facility] and updated as appropriate.  Labs reviewed in EPIC    OBJECTIVE:                                                    /60  Pulse 80  Temp 97  F (36.1  C) (Oral)  Wt 174 lb 12.8 oz (79.3 kg)  SpO2 98%  BMI 27.38 kg/m2 Body mass index is 27.38 kg/(m^2).   GENERAL:  Alert and oriented x 3. No acute distress. WD WN  HEAD:  Normocephalic.Atramautic  EYES:  PERRLA.  EOMs are full.  Sclerae are clear.  Fundi not visualized. No discharge  EARS:  Normal canal without edema exudates or discharge. TM normal. No bulging or retraction  NOSE:  Clear rhinorrhea  MOUTH/THROAT:  Oral hygene is good. Moist mucus membranes. No oral or pharyngeal lesions are seen. Oropharynx without exudates edema. With mild erythema.  NECK:  Supple.  No lymphadenopathy. ROM intact without nuchal rigidity.  LUNGS:  Clear to auscultation bilaterally without rhonchi rhales or wheezes. Chest rise symmetrical and no tenderness to palpation. Good breath sounds throughout.  HEART:  Regular rhythm.  Normal rate.  No murmur, gallop, rub or  ectopy.  PMI is not displaced.  EXTREMITIES:  Warm, well perfused with good ROM and strength. No cyanosis or edema.    SKIN:  Warm and dry.  No rashes  NEUROLOGIC:  CN 2-12 grossly intact.  No focal neurological deficits.         ASSESSMENT/PLAN:                                                        ICD-10-CM    1. Mild persistent asthma with acute exacerbation J45.31 INHALATION/NEBULIZER TREATMENT, INITIAL     ALBUTEROL UNIT DOSE, 1 MG   2. Cough R05 benzonatate (TESSALON) 200 MG capsule     Mild exacerbation of asthma. Explained how to use inhaler. He did not notice much improvement with the albuterol inhaler today although I noticed he was coughing much less. Use benzonatate as needed. See plan below. Return to clinic for any new or worsening symptoms or go to ER  "Urgent care in off hours    > 25 minutes were spent with the patient and / or family present in education and / or counseling regarding the above issues.  This represented more than 50% of the time spent interacting with the patient during this visit.     Patient Instructions   Decrease inhaler to twice daily. If you feel like you need it more, use the other one (albuterol-red one) as needed  Use cough medicine as directed  Return to clinic for any new or worsening symptoms or go to ER Urgent care in off hours           Estimated body mass index is 27.38 kg/(m^2) as calculated from the following:    Height as of 2/28/17: 5' 7\" (1.702 m).    Weight as of this encounter: 174 lb 12.8 oz (79.3 kg).   Weight management plan: See PCP    Mira Correa [facility] [place]  [facility] [place]         "

## 2017-05-01 NOTE — NURSING NOTE
"Chief Complaint   Patient presents with     URI       Initial /60  Pulse 80  Temp 97  F (36.1  C) (Oral)  Wt 174 lb 12.8 oz (79.3 kg)  SpO2 98%  BMI 27.38 kg/m2 Estimated body mass index is 27.38 kg/(m^2) as calculated from the following:    Height as of 2/28/17: 5' 7\" (1.702 m).    Weight as of this encounter: 174 lb 12.8 oz (79.3 kg).  Medication Reconciliation: complete     Mya Miramontes MA      "

## 2017-05-01 NOTE — PATIENT INSTRUCTIONS
Decrease inhaler to twice daily. If you feel like you need it more, use the other one (albuterol-red one) as needed  Use cough medicine as directed  Return to clinic for any new or worsening symptoms or go to ER Urgent care in off hours

## 2017-05-01 NOTE — PROGRESS NOTES
The following nebulizer treatment was given:     MEDICATION: Ipratropium Bromide 0.5mg and Albuterol Sulfate 3mg  : [employer]  LOT #: [id number]  EXPIRATION DATE:  September 2017  NDC # 6302-1565-13

## 2017-05-01 NOTE — MR AVS SNAPSHOT
After Visit Summary   5/1/2017    Abdurahman Waday    MRN: [id number]           Patient Information     Date Of Birth          1950        Visit Information        Provider Department      5/1/2017 4:45 PM Mira Fournier PA-C; [place] [employer] [facility]        Today's Diagnoses     Cough    -  1      Care Instructions    Decrease inhaler to twice daily. If you feel like you need it more, use the other one (albuterol-red one) as needed  Use cough medicine as directed  Return to clinic for any new or worsening symptoms or go to ER Urgent care in off hours          Follow-ups after your visit        Your next 10 appointments already scheduled     May 24, 2017 10:00 AM CDT   LAB with RD LAB   [facility] ([facility])    [address] [place] [address] [phone]           Patient must bring picture ID.  Patient should be prepared to give a urine specimen  Please do not eat 10-12 hours before your appointment if you are coming in fasting for labs on lipids, cholesterol, or glucose (sugar).  Pregnant women should follow their Care Team instructions. Water with medications is okay. Do not drink coffee or other fluids.   If you have concerns about taking  your medications, please ask at office or if scheduling via [employer], send a message by clicking on Secure Messaging, Message Your Care Team.            May 24, 2017 10:30 AM CDT   Office Visit with PAM Mercado CNP   [facility] ([facility])    [address] [place] [address] [phone]           Bring a current list of meds and any records pertaining to this visit.  For Physicals, please bring immunization records and any forms needing to be filled out.  Please arrive 10 minutes early to complete paperwork.              Who to contact     If you have questions or need follow up  "information about today's clinic visit or your schedule please contact [facility] directly at [phone].  Normal or non-critical lab and imaging results will be communicated to you by Globe Icons Interactivehart, letter or phone within 4 business days after the clinic has received the results. If you do not hear from us within 7 days, please contact the clinic through [employer] I[employer] Interactivehart or phone. If you have a critical or abnormal lab result, we will notify you by phone as soon as possible.  Submit refill requests through Datical or call your pharmacy and they will forward the refill request to us. Please allow 3 business days for your refill to be completed.          Additional Information About Your Visit        Globe Icons Interactivehart Information     Datical lets you send messages to your doctor, view your test results, renew your prescriptions, schedule appointments and more. To sign up, go to [email or web address] . Click on \"Log in\" on the left side of the screen, which will take you to the Welcome page. Then click on \"Sign up Now\" on the right side of the page.     You will be asked to enter the access code listed below, as well as some personal information. Please follow the directions to create your username and password.     Your access code is: [email or web address]  Expires: 2017  1:08 PM     Your access code will  in 90 days. If you need help or a new code, please call your [place] clinic or [phone].        Care EveryWhere ID     This is your Care EveryWhere ID. This could be used by other organizations to access your [place] medical records  [id number]        Your Vitals Were     Pulse Temperature Pulse Oximetry BMI (Body Mass Index)          80 97  F (36.1  C) (Oral) 98% 27.38 kg/m2         Blood Pressure from Last 3 Encounters:   17 136/60   17 124/70   17 118/70    Weight from Last 3 Encounters:   17 174 lb 12.8 oz (79.3 kg)   17 174 lb 6.4 oz (79.1 kg)   17 173 lb 8 oz (78.7 " kg)              Today, you had the following     No orders found for display         Today's Medication Changes          These changes are accurate as of: 5/1/17  5:43 PM.  If you have any questions, ask your nurse or doctor.               Start taking these medicines.        Dose/Directions    benzonatate 200 MG capsule   Commonly known as:  TESSALON   Used for:  Cough   Started by:  Mira Fournier PA-C        Dose:  200 mg   Take 1 capsule (200 mg) by mouth 3 times daily as needed for cough   Quantity:  21 capsule   Refills:  0            Where to get your medicines      These medications were sent to [facility] [place] [address]  [address] [facility] [address]     Phone:  [phone]     benzonatate 200 MG capsule                Primary Care Provider Office Phone # Fax #    Mira Fournier PA-C [phone]       [facility] [address]   [facility] [address]        Thank you!     Thank you for choosing [facility]  for your [place]. Our goal is always to provide you with excellent care. Hearing back from our patients is one way we can continue to improve our services. Please take a few minutes to complete the written survey that you may receive in the mail after your visit with us. Thank you!             Your Updated Medication List - Protect others around you: Learn how to safely use, store and throw away your medicines at www.disposemymeds.org.          This list is accurate as of: 5/1/17  5:43 PM.  Always use your most recent med list.                   Brand Name Dispense Instructions for use    albuterol 108 (90 BASE) MCG/ACT Inhaler    albuterol    3 Inhaler    Inhale 2 puffs into the lungs every 6 hours       aspirin 81 MG tablet     90 tablet    Take 1 tablet (81 mg) by mouth daily       atorvastatin 20 MG tablet    LIPITOR    90 tablet    Take 1 tablet (20 mg) by mouth daily       benzonatate 200 MG  capsule    TESSALON    21 capsule    Take 1 capsule (200 mg) by mouth 3 times daily as needed for cough       blood glucose monitoring meter device kit    no brand specified    1 kit    Use to test blood sugar 4 times daily or as directed.       budesonide-formoterol 160-4.5 MCG/ACT Inhaler    SYMBICORT    1 Inhaler    Inhale 2 puffs into the lungs 2 times daily       hydrOXYzine 50 MG capsule    VISTARIL    30 capsule    Take 1 capsule (50 mg) by mouth 3 times daily as needed for itching       isoniazid 300 MG tablet    NYDRAZID    30 tablet    Take 1 tablet (300 mg) by mouth daily       metFORMIN 1000 MG tablet    GLUCOPHAGE    180 tablet    Take 1 tablet (1,000 mg) by mouth 2 times daily (with meals)       naproxen 500 MG tablet    NAPROSYN    30 tablet    Take 1 tablet (500 mg) by mouth 2 times daily as needed for moderate pain       omeprazole 20 MG tablet     90 tablet    Take 1 tablet (20 mg) by mouth daily       Psyllium Husk Powd     1000 g    3 tsp. 3 times daily       ranitidine 300 MG tablet    ZANTAC    30 tablet    Take 1 tablet (300 mg) by mouth At Bedtime       senna 8.6 MG tablet    SENOKOT    30 tablet    Take 1 tablet by mouth daily       sitagliptin 25 MG tablet    JANUVIA    90 tablet    Take 1 tablet (25 mg) by mouth daily       tamsulosin 0.4 MG capsule    FLOMAX    30 capsule    Take 1 capsule (0.4 mg) by mouth daily       triamcinolone 0.1 % ointment    KENALOG    15 g    Apply sparingly to affected area three times daily for 14 days.       TYLENOL PO      Take 500 mg by mouth every 6 hours as needed for mild pain or fever (1-2 tabs)

## 2017-05-16 DIAGNOSIS — E11.9 TYPE 2 DIABETES MELLITUS WITHOUT COMPLICATION, WITHOUT LONG-TERM CURRENT USE OF INSULIN (H): ICD-10-CM

## 2017-05-16 DIAGNOSIS — K59.01 SLOW TRANSIT CONSTIPATION: ICD-10-CM

## 2017-05-16 RX ORDER — SENNOSIDES A AND B 8.6 MG/1
1 TABLET, FILM COATED ORAL DAILY
Qty: 30 TABLET | Refills: 3 | Status: SHIPPED | OUTPATIENT
Start: 2017-05-16

## 2017-05-16 NOTE — TELEPHONE ENCOUNTER
Prescription approved per [facility] Refill Protocol.    Annalee Nuñez RN

## 2017-05-16 NOTE — TELEPHONE ENCOUNTER
One touch ultra 2 glucose          Last Written Prescription Date: 4/19/17  Last Fill Quantity: 1kit, # refills: 0  Last Office Visit with [facility], [facility] or  Health prescribing provider:  5/1/17   Next 5 appointments (look out 90 days)     May 24, 2017 10:30 AM CDT   Office Visit with PAM Mercado CNP   [facility] ([facility])    [address] [place] [address] [phone]                   BP Readings from Last 3 Encounters:   05/01/17 136/60   04/19/17 124/70   02/28/17 118/70     Lab Results   Component Value Date    MICROL 10 02/28/2017     Lab Results   Component Value Date    UMALCR 7.85 02/28/2017     Creatinine   Date Value Ref Range Status   02/28/2017 0.71 0.66 - 1.25 mg/dL Final   ]  GFR Estimate   Date Value Ref Range Status   02/28/2017 >90  Non  GFR Calc   >60 mL/min/1.7m2 Final   10/18/2016 >90  Non  GFR Calc   >60 mL/min/1.7m2 Final   09/15/2016 >90  Non  GFR Calc   >60 mL/min/1.7m2 Final     GFR Estimate If Black   Date Value Ref Range Status   02/28/2017 >90   GFR Calc   >60 mL/min/1.7m2 Final   10/18/2016 >90   GFR Calc   >60 mL/min/1.7m2 Final   09/15/2016 >90   GFR Calc   >60 mL/min/1.7m2 Final     Lab Results   Component Value Date    CHOL 177 04/19/2017     Lab Results   Component Value Date    HDL 42 04/19/2017     Lab Results   Component Value Date     04/19/2017     Lab Results   Component Value Date    TRIG 147 04/19/2017     No results found for: CHOLHDLRATIO  Lab Results   Component Value Date    AST 21 02/28/2017     Lab Results   Component Value Date    ALT 29 02/28/2017     Lab Results   Component Value Date    A1C 8.3 04/19/2017    A1C 8.5 02/28/2017    A1C 7.8 09/15/2016    A1C 7.7 04/19/2016    A1C 7.2 05/12/2015     Potassium   Date Value Ref Range Status   02/28/2017 4.3 3.4 - 5.3 mmol/L Final      Senna 8.6MG tab     Last Written Prescription Date:  4/19/17  Last Fill Quantity: 30,   # refills: 0  Last Office Visit with [facility], [facility] or [facility] prescribing provider: 5/1/17  Future Office visit:    Next 5 appointments (look out 90 days)     May 24, 2017 10:30 AM CDT   Office Visit with PAM Mercado CNP   [facility] [place]    [address] [place] [address] [phone]                   Routing refill request to provider for review/approval because:  Drug not on the [facility], [facility] or [facility] refill protocol or controlled substance

## 2017-05-23 NOTE — PROGRESS NOTES
"  SUBJECTIVE:                                                    Abdurahman Waday is a 67 year old male who presents to clinic today for the following health issues:    Diabetes Follow-up    Patient is checking blood sugars: three times daily.   Blood sugar testing frequency justification: Uncontrolled diabetes  Results are as follows:         am - 130         lunchtime - Under 200              postprandial after lunch- around 180              postprandial after supper- 180-160    Diabetic concerns: blood sugar frequently over 200 at one time when ate something sweet     Symptoms of hypoglycemia (low blood sugar): none always eat on time so none.     Paresthesias (numbness or burning in feet) or sores: No     Date of last diabetic eye exam: 3 or 4 months ago    Patient is taking metformin once a day instead of twice a day.  He is worried that the metformin makes his sugar go to low.  He did not start the Januvia.  Will be observing Ramadan fasting during which he will not be taking pills in the daytime..      Did not  his asthma medications, but feeling no SOB or cough or wheezing    TB - wanting to know if LTBI is cleared.  Stopped taking the medication after four months.  He believes he was told after a normal test that he could stop taking the medication.  He is confused about what was checked on \"recheck\" and believes there was a test showing he does not have TB anymore.    Wants to review cholesterol.     .  Lab Results   Component Value Date    A1C 8.3 04/19/2017    A1C 8.5 02/28/2017    A1C 7.8 09/15/2016    A1C 7.7 04/19/2016    A1C 7.2 05/12/2015     Component      Latest Ref Rng & Units 4/19/2017   Cholesterol      <200 mg/dL 177   Triglycerides      <150 mg/dL 147   HDL Cholesterol      >39 mg/dL 42   LDL Cholesterol Calculated      <100 mg/dL 106 (H)   Non HDL Cholesterol      <130 mg/dL 135 (H)          Amount of exercise or physical activity: 6-7 days/week for an average of 30-45 " minutes    Problems taking medications regularly: No    Medication side effects: none    Diet: low salt and no sugar intake.        Questions/Concerns: Want to recheck Cholesterol and ask if taking juice is good or not for his diabetes.       Problem list and histories reviewed & adjusted, as indicated.  Additional history: as documented    Reviewed and updated as needed this visit by clinical staff       Reviewed and updated as needed this visit by Provider         ROS:  Constitutional, HEENT, cardiovascular, pulmonary, gi and gu systems are negative, except as otherwise noted.    OBJECTIVE:                                                    /74 (BP Location: Right arm, Patient Position: Chair, Cuff Size: Adult Regular)  Pulse 68  Temp 98.2  F (36.8  C) (Oral)  Wt 172 lb 1.6 oz (78.1 kg)  SpO2 97%  BMI 26.95 kg/m2  Body mass index is 26.95 kg/(m^2).  GENERAL: healthy, alert and no distress  EYES: Eyes grossly normal to inspection, PERRL and conjunctivae and sclerae normal  HENT: ear canals and TM's normal, nose and mouth without ulcers or lesions  NECK: no adenopathy, no asymmetry, masses, or scars and thyroid normal to palpation  RESP: lungs clear to auscultation - no rales, rhonchi or wheezes  CV: regular rate and rhythm, normal S1 S2, no S3 or S4, no murmur, click or rub, no peripheral edema and peripheral pulses strong  ABDOMEN: soft, nontender, no hepatosplenomegaly, no masses and bowel sounds normal  MS: no gross musculoskeletal defects noted, no edema  SKIN: no suspicious lesions or rashes  NEURO: Normal strength and tone, mentation intact and speech normal  PSYCH: mentation appears normal, affect normal/bright    Diagnostic Test Results:  Results for orders placed or performed in visit on 05/24/17   Albumin Random Urine Quantitative   Result Value Ref Range    Creatinine Urine 116 mg/dL    Albumin Urine mg/L 11 mg/L    Albumin Urine mg/g Cr 9.57 0 - 17 mg/g Cr   Hemoglobin A1c   Result Value Ref  Range    Hemoglobin A1C 8.3 (H) 4.3 - 6.0 %        ASSESSMENT/PLAN:                                                    Diabetes Type II, A1c Uncontrolled, non-insulin dependent   Associated with the following complications    Renal Complications:  None    Ophthalmologic Complications: None    Neurologic Complications: None    Peripheral Vascular Complications:  None    Other: None   Plan:  Medications:  Metformin - discussed Ramadan dosing and Januvia - advised to start  Referrals:  Diabetic Education    Asthma: Mild persistent--controlled   Plan:  Restart medications          (E11.65) Type 2 diabetes mellitus with hyperglycemia, without long-term current use of insulin (H)  (primary encounter diagnosis)  Comment:   Plan: Albumin Random Urine Quantitative, Hemoglobin         A1c, DIABETES EDUCATOR REFERRAL            (J45.30) Mild persistent asthma without complication  Comment:   Plan: budesonide-formoterol (SYMBICORT) 160-4.5         MCG/ACT Inhaler, albuterol (ALBUTEROL) 108 (90         BASE) MCG/ACT Inhaler            (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  Start Januvia medication.    After Ramadan - please start taking your metformin twice a day - the entire 1000 mg.  If you want to cut it in half, make sure to take both halves.    Return four weeks after Ramadan - after you have been taking the medication twice a day    When you return, please bring your meal records  No juice          PAM Thompson [facility]

## 2017-05-24 ENCOUNTER — OFFICE VISIT (OUTPATIENT)
Dept: FAMILY MEDICINE | Facility: CLINIC | Age: 67
End: 2017-05-24
Payer: COMMERCIAL

## 2017-05-24 ENCOUNTER — CARE COORDINATION (OUTPATIENT)
Dept: CARE COORDINATION | Facility: CLINIC | Age: 67
End: 2017-05-24

## 2017-05-24 VITALS
BODY MASS INDEX: 26.95 KG/M2 | TEMPERATURE: 98.2 F | SYSTOLIC BLOOD PRESSURE: 112 MMHG | HEART RATE: 68 BPM | OXYGEN SATURATION: 97 % | WEIGHT: 172.1 LBS | DIASTOLIC BLOOD PRESSURE: 74 MMHG

## 2017-05-24 DIAGNOSIS — Z22.7 LTBI (LATENT TUBERCULOSIS INFECTION): ICD-10-CM

## 2017-05-24 DIAGNOSIS — J45.30 MILD PERSISTENT ASTHMA WITHOUT COMPLICATION: ICD-10-CM

## 2017-05-24 DIAGNOSIS — E11.65 TYPE 2 DIABETES MELLITUS WITH HYPERGLYCEMIA, WITHOUT LONG-TERM CURRENT USE OF INSULIN (H): Primary | ICD-10-CM

## 2017-05-24 LAB — HBA1C MFR BLD: 8.3 % (ref 4.3–6)

## 2017-05-24 PROCEDURE — 36415 COLL VENOUS BLD VENIPUNCTURE: CPT | Performed by: NURSE PRACTITIONER

## 2017-05-24 PROCEDURE — 83036 HEMOGLOBIN GLYCOSYLATED A1C: CPT | Performed by: NURSE PRACTITIONER

## 2017-05-24 PROCEDURE — 82043 UR ALBUMIN QUANTITATIVE: CPT | Performed by: NURSE PRACTITIONER

## 2017-05-24 PROCEDURE — 99214 OFFICE O/P EST MOD 30 MIN: CPT | Performed by: NURSE PRACTITIONER

## 2017-05-24 RX ORDER — ALBUTEROL SULFATE 90 UG/1
2 AEROSOL, METERED RESPIRATORY (INHALATION) EVERY 6 HOURS
Qty: 3 INHALER | Refills: 1 | Status: SHIPPED | OUTPATIENT
Start: 2017-05-24

## 2017-05-24 RX ORDER — BUDESONIDE AND FORMOTEROL FUMARATE DIHYDRATE 160; 4.5 UG/1; UG/1
2 AEROSOL RESPIRATORY (INHALATION) 2 TIMES DAILY
Qty: 3 INHALER | Refills: 1 | Status: SHIPPED | OUTPATIENT
Start: 2017-05-24

## 2017-05-24 ASSESSMENT — ANXIETY QUESTIONNAIRES
6. BECOMING EASILY ANNOYED OR IRRITABLE: NOT AT ALL
7. FEELING AFRAID AS IF SOMETHING AWFUL MIGHT HAPPEN: NOT AT ALL
GAD7 TOTAL SCORE: 2
IF YOU CHECKED OFF ANY PROBLEMS ON THIS QUESTIONNAIRE, HOW DIFFICULT HAVE THESE PROBLEMS MADE IT FOR YOU TO DO YOUR WORK, TAKE CARE OF THINGS AT HOME, OR GET ALONG WITH OTHER PEOPLE: NOT DIFFICULT AT ALL
2. NOT BEING ABLE TO STOP OR CONTROL WORRYING: SEVERAL DAYS
5. BEING SO RESTLESS THAT IT IS HARD TO SIT STILL: NOT AT ALL
3. WORRYING TOO MUCH ABOUT DIFFERENT THINGS: SEVERAL DAYS
1. FEELING NERVOUS, ANXIOUS, OR ON EDGE: NOT AT ALL

## 2017-05-24 ASSESSMENT — PATIENT HEALTH QUESTIONNAIRE - PHQ9: 5. POOR APPETITE OR OVEREATING: NOT AT ALL

## 2017-05-24 NOTE — NURSING NOTE
"Chief Complaint   Patient presents with     Diabetes       Initial /74 (BP Location: Right arm, Patient Position: Chair, Cuff Size: Adult Regular)  Pulse 68  Temp 98.2  F (36.8  C) (Oral)  Wt 172 lb 1.6 oz (78.1 kg)  SpO2 97%  BMI 26.95 kg/m2 Estimated body mass index is 26.95 kg/(m^2) as calculated from the following:    Height as of 2/28/17: 5' 7\" (1.702 m).    Weight as of this encounter: 172 lb 1.6 oz (78.1 kg).  Medication Reconciliation: complete     Andrew Samuels MA      "

## 2017-05-24 NOTE — MR AVS SNAPSHOT
After Visit Summary   5/24/2017    Abdurahman Waday    MRN: [id number]           Patient Information     Date Of Birth          1950        Visit Information        Provider Department      5/24/2017 10:30 AM Nicki Fischer APRN CNP; [employer] [facility]        Today's Diagnoses     Type 2 diabetes mellitus with hyperglycemia, without long-term current use of insulin (H)    -  1    Mild persistent asthma without complication        LTBI (latent tuberculosis infection)          Care Instructions    During Ramadan - Take metformin once a day.  Start Januvia medication.    After Ramadan - please start taking your metformin twice a day - the entire 1000 mg.  If you want to cut it in half, make sure to take both halves.    Return four weeks after Ramadan - after you have been taking the medication twice a day    When you return, please bring your meal records  No juice              Follow-ups after your visit        Who to contact     If you have questions or need follow up information about today's clinic visit or your schedule please contact [facility] directly at [phone].  Normal or non-critical lab and imaging results will be communicated to you by intelloCuthart, letter or phone within 4 business days after the clinic has received the results. If you do not hear from us within 7 days, please contact the clinic through FiFullyt or phone. If you have a critical or abnormal lab result, we will notify you by phone as soon as possible.  Submit refill requests through Integromics or call your pharmacy and they will forward the refill request to us. Please allow 3 business days for your refill to be completed.          Additional Information About Your Visit        MyChart Information     Integ[employer]ics lets you send messages to your doctor, view your test results, renew your prescriptions, schedule appointments and more. To sign up, go to [email or web address] . Click  "on \"Log in\" on the left side of the screen, which will take you to the Welcome page. Then click on \"Sign up Now\" on the right side of the page.     You will be asked to enter the access code listed below, as well as some personal information. Please follow the directions to create your username and password.     Your access code is: [email or web address]  Expires: 2017  1:08 PM     Your access code will  in 90 days. If you need help or a new code, please call your [place] clinic or [phone].        Care EveryWhere ID     This is your Care EveryWhere ID. This could be used by other organizations to access your [place] medical records  [id number]        Your Vitals Were     Pulse Temperature Pulse Oximetry BMI (Body Mass Index)          68 98.2  F (36.8  C) (Oral) 97% 26.95 kg/m2         Blood Pressure from Last 3 Encounters:   17 112/74   17 136/60   17 124/70    Weight from Last 3 Encounters:   17 172 lb 1.6 oz (78.1 kg)   17 174 lb 12.8 oz (79.3 kg)   17 174 lb 6.4 oz (79.1 kg)              We Performed the Following     Albumin Random Urine Quantitative     Hemoglobin A1c          Today's Medication Changes          These changes are accurate as of: 17 12:02 PM.  If you have any questions, ask your nurse or doctor.               Stop taking these medicines if you haven't already. Please contact your care team if you have questions.     benzonatate 200 MG capsule   Commonly known as:  TESSALON   Stopped by:  Nicki Fischer APRN CNP                Where to get your medicines      These medications were sent to [facility] [place] [address] [address]     Phone:  [phone]     albuterol 108 (90 BASE) MCG/ACT Inhaler    budesonide-formoterol 160-4.5 MCG/ACT Inhaler    metFORMIN 1000 MG tablet                Primary Care Provider Office Phone # Fax #    Mira Fournier PA-C [phone] "       [facility] [address]   [facility] [address]        Thank you!     Thank you for [place] [facility]  [place]. Our goal is always to provide you with excellent care. Hearing back from our patients is one way we can continue to improve our services. Please take a few minutes to complete the written survey that you may receive in the mail after your visit with us. Thank you!             Your Updated Medication List - Protect others around you: Learn how to safely use, store and throw away your medicines at www.disposemymeds.org.          This list is accurate as of: 5/24/17 12:02 PM.  Always use your most recent med list.                   Brand Name Dispense Instructions for use    albuterol 108 (90 BASE) MCG/ACT Inhaler    albuterol    3 Inhaler    Inhale 2 puffs into the lungs every 6 hours       aspirin 81 MG tablet     90 tablet    Take 1 tablet (81 mg) by mouth daily       atorvastatin 20 MG tablet    LIPITOR    90 tablet    Take 1 tablet (20 mg) by mouth daily       blood glucose monitoring meter device kit    no brand specified    1 kit    Use to test blood sugar 4 times daily or as directed.       budesonide-formoterol 160-4.5 MCG/ACT Inhaler    SYMBICORT    3 Inhaler    Inhale 2 puffs into the lungs 2 times daily       hydrOXYzine 50 MG capsule    VISTARIL    30 capsule    Take 1 capsule (50 mg) by mouth 3 times daily as needed for itching       isoniazid 300 MG tablet    NYDRAZID    30 tablet    Take 1 tablet (300 mg) by mouth daily       metFORMIN 1000 MG tablet    GLUCOPHAGE    180 tablet    Take 1 tablet (1,000 mg) by mouth 2 times daily (with meals)       naproxen 500 MG tablet    NAPROSYN    30 tablet    Take 1 tablet (500 mg) by mouth 2 times daily as needed for moderate pain       omeprazole 20 MG tablet     90 tablet    Take 1 tablet (20 mg) by mouth daily       Psyllium Husk Powd     1000 g    3 tsp. 3 times daily       ranitidine 300 MG tablet     ZANTAC    30 tablet    Take 1 tablet (300 mg) by mouth At Bedtime       senna 8.6 MG tablet    SENOKOT    30 tablet    Take 1 tablet by mouth daily       sitagliptin 25 MG tablet    JANUVIA    90 tablet    Take 1 tablet (25 mg) by mouth daily       tamsulosin 0.4 MG capsule    FLOMAX    30 capsule    Take 1 capsule (0.4 mg) by mouth daily       triamcinolone 0.1 % ointment    KENALOG    15 g    Apply sparingly to affected area three times daily for 14 days.       TYLENOL PO      Take 500 mg by mouth every 6 hours as needed for mild pain or fever (1-2 tabs)

## 2017-05-24 NOTE — PATIENT INSTRUCTIONS
During Ramadan - Take metformin once a day.  Start Januvia medication.    After Ramadan - please start taking your metformin twice a day - the entire 1000 mg.  If you want to cut it in half, make sure to take both halves.    Return four weeks after Ramadan - after you have been taking the medication twice a day    When you return, please bring your meal records  No juice

## 2017-05-25 LAB
CREAT UR-MCNC: 116 MG/DL
MICROALBUMIN UR-MCNC: 11 MG/L
MICROALBUMIN/CREAT UR: 9.57 MG/G CR (ref 0–17)

## 2017-05-25 ASSESSMENT — PATIENT HEALTH QUESTIONNAIRE - PHQ9: SUM OF ALL RESPONSES TO PHQ QUESTIONS 1-9: 0

## 2017-05-25 ASSESSMENT — ANXIETY QUESTIONNAIRES: GAD7 TOTAL SCORE: 2

## 2017-05-26 NOTE — PROGRESS NOTES
Met with patient at clinic visit per request of PCP to discuss healthy eating in diabetic patient. Questions answered. Referred to diabetic educator for additional teaching. Left msg for [facility]   to contact me about how to obtain [facility]'s Ramadan instruction sheet in [place] language.     Will refer to [facility] team for follow up.     January Baxter R.N.  Clinic Care Coordinator  [facility]  [phone]

## 2017-05-30 NOTE — PROGRESS NOTES
Will follow up and assist with scheduling with diabetic educator.    Thanks  Blessing Andres RN, BSN, PHN  [facility]  [phone]  Fax: [phone]

## 2017-05-30 NOTE — PROGRESS NOTES
services isn't calling me back. Can you please follow up with getting him Ramadan fasting info in [facility]? Thanks!!

## 2017-06-01 ENCOUNTER — TELEPHONE (OUTPATIENT)
Dept: FAMILY MEDICINE | Facility: CLINIC | Age: 67
End: 2017-06-01

## 2017-06-01 RX ORDER — ISONIAZID 300 MG/1
300 TABLET ORAL DAILY
Qty: 90 TABLET | Refills: 1 | Status: SHIPPED | OUTPATIENT
Start: 2017-06-01

## 2017-06-01 NOTE — TELEPHONE ENCOUNTER
Attempted call via [facility] . Voicemail is full. Will need to try again.    Kelin Michael RN  [facility]

## 2017-06-01 NOTE — TELEPHONE ENCOUNTER
Call to patient with [employer] . Informed of provider message. He states his understanding, denies further questions at this time.     Anna Moreira RN  [facility]

## 2017-06-01 NOTE — TELEPHONE ENCOUNTER
Can you please call the patient and let him know that he should restart the INH and we will have him finish 9 months total.  He has already taken four months, so he will be on the medication another 5 months.  I've put in the medication and he should be able to get 3 months with the fill and will need to  a refill.  He takes this only once a day, so should be able to take it during Ramadan.  LISA Canchola

## 2017-06-05 ENCOUNTER — CARE COORDINATION (OUTPATIENT)
Dept: GERIATRIC MEDICINE | Facility: CLINIC | Age: 67
End: 2017-06-05

## 2017-06-05 NOTE — PROGRESS NOTES
Call placed to member via [employer] to scheduled DM education.  Scheduled for June 16 at 930AM at the [place] location.   Requested Ramadan diet information translated to [employer] for pt education. Will mail to member when received.   Blessing Andres RN, BSN, N  [facility]  [phone]  Fax: [phone]

## 2017-06-05 NOTE — PROGRESS NOTES
e-mail from [employer], they are unable to translate the attachment i sent them on Ramadan fasting and DM.  Offered to move up appointment with DM education.  Member declines, will keep scheduled. appointment.   Blessing Andres RN, BSN, PHN  [facility]  [phone]  Fax: [phone]

## 2017-06-15 ENCOUNTER — CARE COORDINATION (OUTPATIENT)
Dept: GERIATRIC MEDICINE | Facility: CLINIC | Age: 67
End: 2017-06-15

## 2017-06-15 NOTE — PROGRESS NOTES
Rec'd information from [facility] that client has contacted them and has expressed concern about his current services and that he was unhappy with them.   CM attempted to reach client with an , no answer and not able to leave a voice message.  Client is due for annual assessment, client's CM is planning to complete an assessment with a co worker.   CM to follow.  Patti Barker RN, BC  [facility]   [phone] (Fax)

## 2017-06-19 ENCOUNTER — CARE COORDINATION (OUTPATIENT)
Dept: GERIATRIC MEDICINE | Facility: CLINIC | Age: 67
End: 2017-06-19

## 2017-06-19 NOTE — PROGRESS NOTES
Scheduled annual assessment for 6/22/17 at 10am.  Co visit with Callie Mayorga RN.  January yanez for .   Blessing Andres RN, BSN, PHN  [facility]  [phone]  Fax: [phone]

## 2017-06-22 ENCOUNTER — CARE COORDINATION (OUTPATIENT)
Dept: GERIATRIC MEDICINE | Facility: CLINIC | Age: 67
End: 2017-06-22

## 2017-06-27 ENCOUNTER — CARE COORDINATION (OUTPATIENT)
Dept: GERIATRIC MEDICINE | Facility: CLINIC | Age: 67
End: 2017-06-27

## 2017-06-27 NOTE — PROGRESS NOTES
[facility]:  Emailed completed PCA assessment to [facility].  Faxed copy of PCA assessment to [facility] Agency and mailed copy to member.  Faxed MD Communication to PCP.       Marisela Forbes  Case Management Specialist  [facility]   [phone]

## 2017-06-29 NOTE — PROGRESS NOTES
"Home visit/Wood Risk Assessment/EW screening completed on: 6/22/17  Member resides: Assessment completed in members apartment. Member lives alone in a high rise in [place].  Apartment is clean and free from clutter.  Apartment handicap accessible.  Present at assessment: Member, KALEY, Callie Mayorga RN [facility] and miguelito yanez for interpreting.   Member currently receiving the following services: PCA 30 minutes/day.    See EMR for a list of client's diagnoses and medications.   Medication management: Medications reviewed:No. Medication management: Independent-does not set up. Medication understanding:Patient has understanding of regimen and is adherent No.  Some medications missing from home.  Member states he does not need them anymore. MTM offered.  CM reviewed glucometer in home.  Many values out of range.  Member does have DM teaching appointment scheduled.  Member is reluctant to attend appointment,  CM strongly recommends he attend.  Member agrees to attend.   Member states flomax not being effective.  States will discuss this with his PCP.   Member c/o mild pain.  States pain in right shoulder and back. NO tyl in home, states tolerates pain without medications.  States pain at its worst in the last 2 weeks is 6/10.  Reports pain but does not interfere with his ability to complete ADLs.   Member states has had 1 fall in the last year. States it was a fall in the bathroom.  CM assesses bathroom and notes that his shower chair is a folding chair.  CM offers to order him a shower chair, member agrees.   Member states this year he is too weak to take the bus. This is a change from last assessment, last year member often rode the bus. Was main form of transportation.   Member Mood/behavior-PHQ9 score:  8.   Plan of Care: Member was reassessed for 30 min PCA daily.  This was a disappointment to him.  He wanted \"more hours\".  After discussion of what he thought he needed help with.  Member states needs PCA to " help with laundry, cooking and cleaning.  Education provided to member that those are homemaking tasks not personal care, which PCA is for.  Of note, per timecards from agency.  PCA only comes on the weekends. Member then asks for homemaking and adult day care.  Unfortunately, member does not meet EW criteria.  Education provided that this is a specific program to keep seniors living independently in the community and out of the nursing home.  Education provided to member about how someone meets EW criteria.  Member is disappointed but states he understands.     ADLs, assessed as followed:  Dressing-Client reports right shoulder pain and weakness and that he is still able to dress self with left arm helping weak arm. Observed client to be able to touch head and back with both arms as well as push arms out front of him and back a few times to demonstrate exercising, as well almost touch his toes while sitting. Client was able to bend leg and put one leg on knee, and then the other leg over the other knee so able to reach feet.   Grooming- Client reports he can brush his own teeth, wash face and shave himself. Client keeps hair short. Client demonstrated pretending to brush teeth with right hand going back in forth of teeth and then with left hand doing same thing. Client also able to touch face, top, sides and back of head with both hands.   Bathing-Client reports he can get in and out of tub with use of his cane. Client reports he uses a chair in the shower. Chair was a plastic folding chair client reported purchasing on his own. Discussed a regular bath bench and client is interested in one with a back. Client states his friend, PCA or son help with washing his back. There is a pitcher next to the tub that they use to pour water over his back. Client also has a back brush that he reports his friend, PCA or son use to wash his back. Observed client be able to sit in his chair and lift one leg over his knee and then  the other knee over his knee, as well as reach behind himself and touch arms and face.   Eating-Client reports independence with feeding himself. Observed client to be able to bring both hands to and above his face with no difficulties.  Transfers-Client reports independence with transfers out of bed and chairs. When we arrived for the visit, client was on floor but able to get up on his own with use of his cane. Observed client also be able to get up from his chair in the living room as well as out of bed with use of his cane and no other assist.   Mobility-Client reports independence with walking with cane. States he should have someone with him when he takes a bus. Observed client to walk in apt with cane and no other assist. Client states he has been walking to [facility] daily during Ramadan, as it is nearby.  Positioning-Client reports that he is independent with positioning. Observed client to reposition self in chair and while reclining with no difficulties.   Toileting-Client reports he toilets self independently including getting on and off toilet as well as personal hygiene. Client demonstrated by sitting on closed toilet and being able to reach and tear off some toilet paper and reach behind self.     CM will complete DTR for EW, Homemaking,  ADC and additional PCA  Follow-Up Plan: Member informed of future contact, plan to f/u with member with a 6 month telephone assessment.  Contact information shared with member and family, encouraged member to call with any questions or concerns prior to this.   See [facility] for further detailed information    Blessing Andres RN, BSN, PHN  [facility]  [phone]  Fax: [phone]

## 2017-06-30 ASSESSMENT — PATIENT HEALTH QUESTIONNAIRE - PHQ9: SUM OF ALL RESPONSES TO PHQ QUESTIONS 1-9: 8

## 2017-07-10 ENCOUNTER — CARE COORDINATION (OUTPATIENT)
Dept: GERIATRIC MEDICINE | Facility: CLINIC | Age: 67
End: 2017-07-10

## 2017-07-10 NOTE — PROGRESS NOTES
Order for shower chair with a back placed with [facility].  Is per member request, is currently using folding chair in shower.   Blessing Andres RN, BSN, N  [facility]  [phone]  Fax: [phone]

## 2017-07-11 ENCOUNTER — CARE COORDINATION (OUTPATIENT)
Dept: GERIATRIC MEDICINE | Facility: CLINIC | Age: 67
End: 2017-07-11

## 2017-07-11 NOTE — PROGRESS NOTES
Received after visit chart from care coordinator.  Completed following tasks: Mailed copy of care plan to client  Entered MMIS  Chart was returned to CC.       Marisela Forbes  Case Management Specialist  [facility]   [phone]

## 2017-07-11 NOTE — PROGRESS NOTES
Received a request to submit a DTR for the denial of adult day care and homemaking. Documentation completed and faxed to the health plan.  [place].    Viridiana Clemons RN  Utilization   [facility]  [phone]

## 2017-07-13 ENCOUNTER — CARE COORDINATION (OUTPATIENT)
Dept: GERIATRIC MEDICINE | Facility: CLINIC | Age: 67
End: 2017-07-13

## 2017-07-13 DIAGNOSIS — Z76.89 HEALTH CARE HOME: ICD-10-CM

## 2017-07-14 NOTE — PROGRESS NOTES
Abdurahman Waday ([birth year]) - [facility] - 08/01/17-05/31/18 - 0.5 hrs/day PCA                                                                       Denials - 07/13/17 - ADC; 07/27/17 - maisha

## 2017-07-25 DIAGNOSIS — E11.9 TYPE 2 DIABETES MELLITUS WITHOUT COMPLICATION, WITHOUT LONG-TERM CURRENT USE OF INSULIN (H): ICD-10-CM

## 2017-07-25 NOTE — TELEPHONE ENCOUNTER
Routing refill request to provider for review/approval because:  Patient needs to be seen because:  Last office visit was advised to follow up in 4 weeks.     Called pt via [email or web address]  and he could not tell me the name of his medications when I asked him if he was taking the metformin and the Januvia. Advised that pt is due for an appt and he said he could not talk right now and requested to be called tomorrow.    Kelin Michael RN  [facility]

## 2017-07-25 NOTE — TELEPHONE ENCOUNTER
SOFTCLIX LANCETS USE TO TEST BLOOD SUGAR 4 X DAILY      Last Written Prescription Date:  5/16/17 per PHARMACY  Last Fill Quantity: ,   # refills:   Last Office Visit with [facility], [facility] or  Health prescribing provider: 7/20/17  Future Office visit:       Routing refill request to provider for review/approval because:  Drug not active on patient's medication list    JANUVIA 25MG TAB         Last Written Prescription Date: 4/27/17  Last Fill Quantity: 90, # refills: 0  Last Office Visit with [facility], [facility] or  Health prescribing provider:  7/20/17        BP Readings from Last 3 Encounters:   05/24/17 112/74   05/01/17 136/60   04/19/17 124/70     Lab Results   Component Value Date    MICROL 11 05/24/2017     Lab Results   Component Value Date    UMALCR 9.57 05/24/2017     Creatinine   Date Value Ref Range Status   02/28/2017 0.71 0.66 - 1.25 mg/dL Final   ]  GFR Estimate   Date Value Ref Range Status   02/28/2017 >90  Non  GFR Calc   >60 mL/min/1.7m2 Final   10/18/2016 >90  Non  GFR Calc   >60 mL/min/1.7m2 Final   09/15/2016 >90  Non  GFR Calc   >60 mL/min/1.7m2 Final     GFR Estimate If Black   Date Value Ref Range Status   02/28/2017 >90   GFR Calc   >60 mL/min/1.7m2 Final   10/18/2016 >90   GFR Calc   >60 mL/min/1.7m2 Final   09/15/2016 >90   GFR Calc   >60 mL/min/1.7m2 Final     Lab Results   Component Value Date    CHOL 177 04/19/2017     Lab Results   Component Value Date    HDL 42 04/19/2017     Lab Results   Component Value Date     04/19/2017     Lab Results   Component Value Date    TRIG 147 04/19/2017     No results found for: CHOLHDLRATIO  Lab Results   Component Value Date    AST 21 02/28/2017     Lab Results   Component Value Date    ALT 29 02/28/2017     Lab Results   Component Value Date    A1C 8.3 05/24/2017    A1C 8.3 04/19/2017    A1C 8.5 02/28/2017    A1C 7.8 09/15/2016    A1C 7.7 04/19/2016     Potassium    Date Value Ref Range Status   02/28/2017 4.3 3.4 - 5.3 mmol/L Final

## 2017-08-17 ENCOUNTER — CARE COORDINATION (OUTPATIENT)
Dept: GERIATRIC MEDICINE | Facility: CLINIC | Age: 67
End: 2017-08-17

## 2017-08-17 NOTE — TELEPHONE ENCOUNTER
Call to patient with [employer] , unable to leave a message because mailbox Is full.     Anna Moreira RN  [facility]

## 2017-08-17 NOTE — PROGRESS NOTES
Was contacted by [facility] that they have made multiple attempts to reach this member to deliver shower chair- both voicemail in his language and knocking on his door with no responce.  Have been unsuccessful in attempting to reach him for delivery.  Order has been canceled.   Blessing nAdres RN, BSN, PHN  [facility]  [phone]  Fax: [phone]

## 2017-11-09 DIAGNOSIS — E11.9 TYPE 2 DIABETES MELLITUS WITHOUT COMPLICATION, WITHOUT LONG-TERM CURRENT USE OF INSULIN (H): ICD-10-CM

## 2017-11-10 RX ORDER — ASPIRIN 81 MG/1
TABLET ORAL
Qty: 90 TABLET | Refills: 0 | Status: SHIPPED | OUTPATIENT
Start: 2017-11-10

## 2017-11-10 NOTE — TELEPHONE ENCOUNTER
ASPIRIN EC 81 MG TABLET 81 TAB        Last Written Prescription Date:  10/18/16  Last Fill Quantity: 90,   # refills: 3  Future Office visit:       Routing refill request to provider for review/approval because:  Does not meet protocol criteria  LOV: 7/20/17

## 2017-11-10 NOTE — TELEPHONE ENCOUNTER
Prescription approved per [facility] Refill Protocol.    Annalee Nuñez RN   [facility]

## 2018-01-10 ENCOUNTER — CARE COORDINATION (OUTPATIENT)
Dept: GERIATRIC MEDICINE | Facility: CLINIC | Age: 68
End: 2018-01-10

## 2018-01-10 NOTE — PROGRESS NOTES
Call placed to  for 6mo telephone assessment.  Member corral is now being seen at another clinic.  Is now being seen at [facility]. Is aware that he will be contacted by a new CM and will disenroll with  [facility].   Blessing Andres RN, BSN, PHN  [facility]  [phone]  Fax: [phone]

## 2018-01-26 ENCOUNTER — TELEPHONE (OUTPATIENT)
Dept: FAMILY MEDICINE | Facility: CLINIC | Age: 68
End: 2018-01-26

## 2018-01-26 NOTE — TELEPHONE ENCOUNTER
Panel Management Review      Patient has the following on his problem list:     Depression / Dysthymia review    Measure:  Needs PHQ-9 score of 4 or less during index window.  Administer PHQ-9 and if score is 5 or more, send encounter to provider for next steps.    5 - 7 month window range: 01/26/2018    PHQ-9 SCORE 5/24/2017 6/29/2017   Total Score 0 8       If PHQ-9 recheck is 5 or more, route to provider for next steps.    Patient is due for:  None    Asthma review     ACT Total Scores 4/19/2017   ACT TOTAL SCORE (Goal Greater than or Equal to 20) 22   In the past 12 months, how many times did you visit the emergency room for your asthma without being admitted to the hospital? 0   In the past 12 months, how many times were you hospitalized overnight because of your asthma? 0      1. Is Asthma diagnosis on the Problem List? Yes    2. Is Asthma listed on Health Maintenance? Yes    3. Patient is due for:  ACT and AAP      Composite cancer screening  Chart review shows that this patient is due/due soon for the following None  Summary:    Patient is due/failing the following:   A1C, AAP and ACT    Action needed:   Patient needs office visit for asthma and diabetes follow up.    Type of outreach:    Sent letter.    Questions for provider review:    None                                                                                                                                    Andrew Samuels MA     Chart routed to none.

## 2018-01-26 NOTE — LETTER
January 26, 2018      Abbey Waday [address]   [facility] [place] [address]        Dear Abbey,    In order to ensure we are providing the best quality care, we have reviewed your chart and see that you are due for:    1. Diabetes follow up  2. Asthma follow up    Please call the clinic at your earliest convenience to schedule an appointment.  If you have completed these please contact our office via phone or [facility] to update our records.  We would like to know the date (approximately month and year), the result, and ideally where the procedure was performed.    Thank you for trusting us with your health care.      Sincerely,    Care Team for LISA Canchola

## 2018-02-05 DIAGNOSIS — N13.9 URINARY (TRACT) OBSTRUCTION: ICD-10-CM

## 2018-02-05 DIAGNOSIS — E11.9 TYPE 2 DIABETES MELLITUS WITHOUT COMPLICATION, WITHOUT LONG-TERM CURRENT USE OF INSULIN (H): ICD-10-CM

## 2018-02-05 NOTE — TELEPHONE ENCOUNTER
Requested Prescriptions   Pending Prescriptions Disp Refills     ONETOUCH ULTRA test strip [Pharmacy Med Name: ONE TOUCH ULTRA TEST STRIPS STRP] 100 strip 3    Last Written Prescription Date:  07/17/2017  Last Fill Quantity: 100 strip,  # refills: 3   Last Office Visit with speciality provider within that speciality :  05/01/2017   Future Office Visit:      Sig: USE TO TEST BLOOD SUGARS 3 TIMES DAILY.GERMANIA    Diabetic Supplies Protocol Failed    2/5/2018  3:56 PM       Failed - Patient has had appt within past 6 mos    IV to PO - Antibiotics     None                   Passed - Patient is 18 years of age or older

## 2018-02-06 RX ORDER — ASPIRIN 81 MG/1
TABLET ORAL
Qty: 90 TABLET | Refills: 0 | OUTPATIENT
Start: 2018-02-06

## 2018-02-06 RX ORDER — TAMSULOSIN HYDROCHLORIDE 0.4 MG/1
CAPSULE ORAL
Qty: 30 CAPSULE | Refills: 1 | OUTPATIENT
Start: 2018-02-06

## 2018-02-06 NOTE — TELEPHONE ENCOUNTER
"Requested Prescriptions   Pending Prescriptions Disp Refills     ASPIRIN ADULT LOW STRENGTH 81 MG EC tablet [Pharmacy Med Name: ASPIRIN EC 81 MG TABLET 81 TAB] 90 tablet 0    Last Written Prescription Date:  11/10/17  Last Fill Quantity: 90,  # refills: 0   Last Office Visit with [facility] provider:  7/20/17   Future Office Visit:      Sig: TAKE 1 TABLET (81 MG) BY MOUTH DAILY    Analgesics (Non-Narcotic Tylenol and ASA Only) Passed    2/5/2018  3:56 PM       Passed - Recent or future visit with authorizing provider's specialty    Patient had office visit in the last year or has a visit in the next 30 days with authorizing provider.  See \"Patient Info\" tab in inbasket, or \"Choose Columns\" in Meds & Orders section of the refill encounter.            Passed - Patient is age 20 years or older    If ASA is flagged for ages under 20 years old. Forward to provider for confirmation Ryes Syndrome is not a concern.              tamsulosin (FLOMAX) 0.4 MG capsule [Pharmacy Med Name: TAMSULOSIN HCL 0.4 MG CAP 0.4 CAP] 30 capsule 1    Last Written Prescription Date:  1/3/17  Last Fill Quantity: 30,  # refills: 1   Last Office Visit with [facility] provider:  7/20/17   Future Office Visit:      Sig: TAKE 1 CAPSULE (0.4 MG) BY MOUTH DAILY    Alpha Blockers Passed    2/5/2018  3:56 PM       Passed - BP is less than 140/90    BP Readings from Last 3 Encounters:   05/24/17 112/74   05/01/17 136/60   04/19/17 124/70                Passed - Recent or future visit with authorizing provider's specialty    Patient had office visit in the last year or has a visit in the next 30 days with authorizing provider.  See \"Patient Info\" tab in inbasket, or \"Choose Columns\" in Meds & Orders section of the refill encounter.            Passed - Patient does not have Tadalafil, Vardenafil, or Sildenafil on their medication list       Passed - Patient is 18 years of age or older          "

## 2018-02-06 NOTE — TELEPHONE ENCOUNTER
Spoke with patient, he is now being seen at [facility], pharmacy notified of this information.     Anna Moreira BSN RN  [facility]

## 2018-02-06 NOTE — TELEPHONE ENCOUNTER
Called patient, he is being seen by [facility]. Notified pharmacy of this updated information.    LEXI WilkesN RN  [facility]

## 2018-02-07 ENCOUNTER — TELEPHONE (OUTPATIENT)
Dept: FAMILY MEDICINE | Facility: CLINIC | Age: 68
End: 2018-02-07

## 2018-02-07 NOTE — TELEPHONE ENCOUNTER
Called and spoke with the pharmacy, pt is no longer seen at this clinic, he goes to the [facility]    Refill request cancelled    Annalee Nuñez RN   [facility]

## 2018-02-07 NOTE — TELEPHONE ENCOUNTER
Softclix lancets      Last Written Prescription Date:  12/14/17  Last Fill Quantity: 100,   # refills: 0  Last Office Visit: 7/20/17  Future Office visit:       Routing refill request to provider for review/approval because:  Drug not active on patient's medication list

## 2018-05-08 DIAGNOSIS — E11.9 TYPE 2 DIABETES MELLITUS WITHOUT COMPLICATION, WITHOUT LONG-TERM CURRENT USE OF INSULIN (H): ICD-10-CM

## 2018-05-08 RX ORDER — ATORVASTATIN CALCIUM 20 MG/1
TABLET, FILM COATED ORAL
Qty: 90 TABLET | Refills: 3 | OUTPATIENT
Start: 2018-05-08

## 2018-05-08 NOTE — TELEPHONE ENCOUNTER
"Requested Prescriptions   Pending Prescriptions Disp Refills     atorvastatin (LIPITOR) 20 MG tablet [Pharmacy Med Name: ATORVASTATIN 20 MG TABLET 20 TAB]  Last Written Prescription Date:  4/24/17  Last Fill Quantity: 90,  # refills: 3   Last office visit: 5/24/2017 with prescribing provider:  Amado   Future Office Visit:     90 tablet 3     Sig: TAKE 1 TABLET (20 MG) BY MOUTH DAILY    Statins Protocol Failed    5/8/2018 12:02 PM       Failed - LDL on file in past 12 months    Recent Labs   Lab Test  04/19/17   1140   LDL  106*            Passed - No abnormal creatine kinase in past 12 months    No lab results found.            Passed - Recent (12 mo) or future (30 days) visit within the authorizing provider's specialty    Patient had office visit in the last 12 months or has a visit in the next 30 days with authorizing provider or within the authorizing provider's specialty.  See \"Patient Info\" tab in inbasket, or \"Choose Columns\" in Meds & Orders section of the refill encounter.           Passed - Patient is age 18 or older          "

## 2018-05-08 NOTE — TELEPHONE ENCOUNTER
"Requested Prescriptions   Pending Prescriptions Disp Refills     metFORMIN (GLUCOPHAGE) 1000 MG tablet 180 tablet 1    Last Written Prescription Date:  05/24/2017  Last Fill Quantity: 180,  # refills: 1   Last office visit: 5/24/2017 with prescribing provider:  07/20/2017   Future Office Visit:   Sig: Take 1 tablet (1,000 mg) by mouth 2 times daily (with meals)    Biguanide Agents Failed    5/8/2018  1:50 PM       Failed - Blood pressure less than 140/90 in past 6 months    BP Readings from Last 3 Encounters:   05/24/17 112/74   05/01/17 136/60   04/19/17 124/70                Failed - Patient has documented LDL within the past 12 mos.    Recent Labs   Lab Test  04/19/17   1140   LDL  106*            Failed - Patient has documented A1c within the specified period of time.    Recent Labs   Lab Test  05/24/17   1432   A1C  8.3*            Failed - Recent (6 mo) or future (30 days) visit within the authorizing provider's specialty    Patient had office visit in the last 6 months or has a visit in the next 30 days with authorizing provider or within the authorizing provider's specialty.  See \"Patient Info\" tab in inbasket, or \"Choose Columns\" in Meds & Orders section of the refill encounter.           Passed - Patient has had a Microalbumin in the past 12 mos.    Recent Labs   Lab Test  05/24/17   1030   MICROL  11   UMALCR  9.57            Passed - Patient is age 10 or older       Passed - Patient's CR is NOT>1.4 OR Patient's EGFR is NOT<45 within past 12 mos.    Recent Labs   Lab Test  02/28/17   1202   08/12/15   1412   GFR   --    --   89   GFRB   --    --   103   GFRESTIMATED  >90  Non  GFR Calc     < >   --    GFRESTBLACK  >90   GFR Calc     < >   --     < > = values in this interval not displayed.       Recent Labs   Lab Test  02/28/17   1202   08/12/15   1412   CR  0.71   < >   --    CRPOC   --    --   0.9    < > = values in this interval not displayed.            Passed - Patient " does NOT have a diagnosis of CHF.

## 2018-05-08 NOTE — TELEPHONE ENCOUNTER
Contacted patient with assistance of , no longer being seen at this clinic/[place]. Patient has changed providers and is now being seen by [facility].     Claudia Mohan RN  [facility]

## 2018-08-04 ENCOUNTER — APPOINTMENT (OUTPATIENT)
Dept: CT IMAGING | Facility: CLINIC | Age: 68
End: 2018-08-04
Attending: EMERGENCY MEDICINE
Payer: COMMERCIAL

## 2018-08-04 ENCOUNTER — HOSPITAL ENCOUNTER (EMERGENCY)
Facility: CLINIC | Age: 68
Discharge: HOME | End: 2018-08-04
Attending: EMERGENCY MEDICINE | Admitting: EMERGENCY MEDICINE
Payer: COMMERCIAL

## 2018-08-04 VITALS
OXYGEN SATURATION: 98 % | SYSTOLIC BLOOD PRESSURE: 126 MMHG | DIASTOLIC BLOOD PRESSURE: 82 MMHG | RESPIRATION RATE: 18 BRPM | HEIGHT: 69 IN

## 2018-08-04 DIAGNOSIS — S09.90XA MINOR HEAD INJURY, INITIAL ENCOUNTER: ICD-10-CM

## 2018-08-04 DIAGNOSIS — S01.01XA LACERATION OF SCALP, INITIAL ENCOUNTER: ICD-10-CM

## 2018-08-04 PROCEDURE — 99284 EMERGENCY DEPT VISIT MOD MDM: CPT | Mod: 25 | Performed by: EMERGENCY MEDICINE

## 2018-08-04 PROCEDURE — 25000132 ZZH RX MED GY IP 250 OP 250 PS 637: Performed by: EMERGENCY MEDICINE

## 2018-08-04 PROCEDURE — 99282 EMERGENCY DEPT VISIT SF MDM: CPT | Mod: Z6 | Performed by: EMERGENCY MEDICINE

## 2018-08-04 PROCEDURE — 70450 CT HEAD/BRAIN W/O DYE: CPT

## 2018-08-04 RX ORDER — ACETAMINOPHEN 500 MG
1000 TABLET ORAL ONCE
Status: COMPLETED | OUTPATIENT
Start: 2018-08-04 | End: 2018-08-04

## 2018-08-04 RX ADMIN — ACETAMINOPHEN 1000 MG: 500 TABLET, FILM COATED ORAL at 04:51

## 2018-08-04 NOTE — ED AVS SNAPSHOT
[facility], [place], Emergency Department    [address] [place] [address]    Phone:  [phone]                                       Abdurahman Waday   MRN: [id number]    Department:  [facility], Emergency Department   Date of Visit:  8/4/2018           After Visit Summary Signature Page     I have received my discharge instructions, and my questions have been answered. I have discussed any challenges I see with this plan with the nurse or doctor.    ..........................................................................................................................................  Patient/Patient Representative Signature      ..........................................................................................................................................  Patient Representative Print Name and Relationship to Patient    ..................................................               ................................................  Date                                            Time    ..........................................................................................................................................  Reviewed by Signature/Title    ...................................................              ..............................................  Date                                                            Time

## 2018-08-04 NOTE — ED PROVIDER NOTES
"  History     Chief Complaint   Patient presents with     Fall     Head Laceration     HPI  Abdurahman Waday is a 68 year old male history of asthma, diabetes, presents with head injury.  Patient had a mechanical fall on the medical floors where he is with his son.  He tripped over the arm of a reclining chair falling forward into a wheelchair. Did not lose consciousness. He may have hit the back of his head against the ground, but he is not sure. Sustained a very small abrasion over his right orbital ridge as well as right occipital scalp.    No headache except for pain over the left side. No nausea no vomiting no visual changes have been completely well prior to this incident.    I have reviewed the Medications, Allergies, Past Medical and Surgical History, and Social History in the Epic system.    Review of Systems   14 point review of symptoms was performed and is negative except as noted above.     Physical Exam   BP: 133/77  Heart Rate: 72  Resp: 16  Height: 175 cm (5' 8.9\")  SpO2: 100 %      Physical Exam  GEN: Well appearing, non toxic, cooperative and conversant.   HEENT: 4 cm curvilinear right occipital scalp laceration, quite superficial non-gaping. Very small abrasion of the right superior orbital ridge just below the eyebrow. No active bleeding. No laceration.   Pupils are equal round and reactive to light. Extraocular motions are intact. There is no facial swelling. The neck is nontender and supple.   C-spine: No midline tenderness.  CV: Regular rate and rhythm without murmurs rubs or gallops. 2+ radial pulses bilaterally.  PULM: Clear to auscultation bilaterally.  ABD: Soft, nontender, nondistended.   EXT: Full range of motion.  No edema.  NEURO: Cranial nerves II through XII are intact and symmetric. Bilateral upper and lower extremities grossly show full range of motion without any focal deficits.   SKIN: No rashes, ecchymosis, or lacerations  PSYCH: Calm and cooperative, interactive.     ED Course "     ED Course     Procedures        CT head without acute process       Labs Ordered and Resulted from Time of ED Arrival Up to the Time of Departure from the ED - No data to display         Assessments & Plan (with Medical Decision Making)   68-year-old male status post mechanical fall and head injury as described CT negative  Occipital laceration is not gaping easily approximates. It was irrigated and repaired with Steri-Strips.  No repair needed along the right superior orbital ridge there is a very tiny abrasion, nonbleeding.  Tetanus updated 2 years ago.    Well-appearing throughout ED stay. Appropriate for outpatient follow-up with infectious and head injury precautions    - Patient agrees to our plan and is ready and eager for discharge. Care plan, follow up plan, and reasons to return immediately to the ED were dicussed in detail and summarized as noted in the discharge instructions.      I have reviewed the nursing notes.    I have reviewed the findings, diagnosis, plan and need for follow up with the patient.    New Prescriptions    No medications on file       Final diagnoses:   Minor head injury, initial encounter   Laceration of scalp, initial encounter       8/4/2018   [facility], [place], EMERGENCY DEPARTMENT     Trey Paiz MD  08/04/18 0434

## 2018-08-04 NOTE — DISCHARGE INSTRUCTIONS
Please make an appointment to follow up with Your Primary Care Provider in 7 days even if entirely better.  You can call to discuss the appropriate follow up timing with your doctor.     No sutures were placed for your scalp laceration. It was repaired with steri-strips.  Please keep them on and dry for 7 days.   Return to the emergency department for any worsening redness, swelling, drainage, pain, fever or any other concerns as given or discussed.     Please return to the Ed for any worsening headache, change in vision, difficulty walking, numbness or tingling, confusion, rashes, fever, change in vision or any other concerns as given or discussed.

## 2018-08-04 NOTE — ED AVS SNAPSHOT
[facility], Emergency Department    [address] [facility] [address]    Phone:  [phone]                                       Abdurahman Waday   MRN: [id number]    Department:  [facility], Emergency Department   Date of Visit:  8/4/2018           Patient Information     Date Of Birth          1950        Your diagnoses for this visit were:     Minor head injury, initial encounter     Laceration of scalp, initial encounter        You were seen by Trey Paiz MD.        Discharge Instructions       Please make an appointment to follow up with Your Primary Care Provider in 7 days even if entirely better.  You can call to discuss the appropriate follow up timing with your doctor.     No sutures were placed for your scalp laceration. It was repaired with steri-strips.  Please keep them on and dry for 7 days.   Return to the emergency department for any worsening redness, swelling, drainage, pain, fever or any other concerns as given or discussed.     Please return to the Ed for any worsening headache, change in vision, difficulty walking, numbness or tingling, confusion, rashes, fever, change in vision or any other concerns as given or discussed.         24 Hour Appointment Hotline       To make an appointment at any [facility], call [phone] ([phone]). If you don't have a family doctor or clinic, we will help you find one. [place] clinics are conveniently located to serve the needs of you and your family.             Review of your medicines      Our records show that you are taking the medicines listed below. If these are incorrect, please call your family doctor or clinic.        Dose / Directions Last dose taken    albuterol 108 (90 Base) MCG/ACT Inhaler   Commonly known as:  PROAIR HFA   Dose:  2 puff   Quantity:  3 Inhaler        Inhale 2 puffs into the lungs every 6 hours   Refills:  1        ASPIRIN ADULT LOW STRENGTH 81 MG EC tablet   Quantity:  90 tablet    Generic drug:  aspirin        TAKE 1 TABLET (81 MG) BY MOUTH DAILY   Refills:  0        atorvastatin 20 MG tablet   Commonly known as:  LIPITOR   Dose:  20 mg   Quantity:  90 tablet        Take 1 tablet (20 mg) by mouth daily   Refills:  3        blood glucose monitoring meter device kit   Commonly known as:  no brand specified   Quantity:  1 kit        Use to test blood sugar 4 times daily or as directed.   Refills:  0        blood glucose monitoring test strip   Commonly known as:  no brand specified   Quantity:  100 strip        Use to test blood sugars 3 times daily.   Refills:  3        budesonide-formoterol 160-4.5 MCG/ACT Inhaler   Commonly known as:  SYMBICORT   Dose:  2 puff   Quantity:  3 Inhaler        Inhale 2 puffs into the lungs 2 times daily   Refills:  1        hydrOXYzine 50 MG capsule   Commonly known as:  VISTARIL   Dose:  50 mg   Quantity:  30 capsule        Take 1 capsule (50 mg) by mouth 3 times daily as needed for itching   Refills:  0        * isoniazid 300 MG tablet   Commonly known as:  NYDRAZID   Dose:  300 mg   Quantity:  30 tablet        Take 1 tablet (300 mg) by mouth daily   Refills:  8        * isoniazid 300 MG tablet   Commonly known as:  NYDRAZID   Dose:  300 mg   Quantity:  90 tablet        Take 1 tablet (300 mg) by mouth daily   Refills:  1        metFORMIN 1000 MG tablet   Commonly known as:  GLUCOPHAGE   Dose:  1000 mg   Quantity:  180 tablet        Take 1 tablet (1,000 mg) by mouth 2 times daily (with meals)   Refills:  1        naproxen 500 MG tablet   Commonly known as:  NAPROSYN   Dose:  500 mg   Quantity:  30 tablet        Take 1 tablet (500 mg) by mouth 2 times daily as needed for moderate pain   Refills:  1        omeprazole 20 MG tablet   Dose:  20 mg   Quantity:  90 tablet        Take 1 tablet (20 mg) by mouth daily   Refills:  3        Psyllium Husk Powd   Dose:  3 tsp.   Quantity:  1000 g        3 tsp. 3 times daily   Refills:  1        ranitidine 300 MG tablet    Commonly known as:  ZANTAC   Dose:  300 mg   Quantity:  30 tablet        Take 1 tablet (300 mg) by mouth At Bedtime   Refills:  0        senna 8.6 MG tablet   Commonly known as:  SENOKOT   Dose:  1 tablet   Quantity:  30 tablet        Take 1 tablet by mouth daily   Refills:  3        sitagliptin 25 MG tablet   Commonly known as:  JANUVIA   Dose:  25 mg   Quantity:  90 tablet        Take 1 tablet (25 mg) by mouth daily   Refills:  0        tamsulosin 0.4 MG capsule   Commonly known as:  FLOMAX   Dose:  0.4 mg   Quantity:  30 capsule        Take 1 capsule (0.4 mg) by mouth daily   Refills:  1        triamcinolone 0.1 % ointment   Commonly known as:  KENALOG   Quantity:  15 g        Apply sparingly to affected area three times daily for 14 days.   Refills:  0        TYLENOL PO   Dose:  500 mg        Take 500 mg by mouth every 6 hours as needed for mild pain or fever (1-2 tabs)   Refills:  0        * Notice:  This list has 2 medication(s) that are the same as other medications prescribed for you. Read the directions carefully, and ask your doctor or other care provider to review them with you.            Procedures and tests performed during your visit     CT Head w/o Contrast      Orders Needing Specimen Collection     None      Pending Results     Date and Time Order Name Status Description    8/4/2018 0314 CT Head w/o Contrast Preliminary             Pending Culture Results     No orders found from 8/2/2018 to 8/5/2018.            Pending Results Instructions     If you had any lab results that were not finalized at the time of your Discharge, you can call the ED Lab Result RN at [phone]. You will be contacted by this team for any positive Lab results or changes in treatment. The nurses are available 7 days a week from 10A to 6:30P.  You can leave a message 24 hours per day and they will return your call.        Thank you for choosing [facility]       Thank you for choosing [facility] for your care. Our goal is  "always to provide you with excellent care. Hearing back from our patients is one way we can continue to improve our services. Please take a few minutes to complete the written survey that you may receive in the mail after you visit with us. Thank you!        [employer] Information     [employer] lets you send messages to your doctor, view your test results, renew your prescriptions, schedule appointments and more. To sign up, go to [email or web address] . Click on \"Log in\" on the left side of the screen, which will take you to the Welcome page. Then click on \"Sign up Now\" on the right side of the page.     You will be asked to enter the access code listed below, as well as some personal information. Please follow the directions to create your username and password.     Your access code is: [email or web address]  Expires: 2018  4:47 AM     Your access code will  in 90 days. If you need help or a new code, please call your [place] clinic or [phone].        Care EveryWhere ID     This is your Care EveryWhere ID. This could be used by other organizations to access your [place] medical records  [id number]        Equal Access to Services     CHARLIE QUAN : Hadvibha Gordillo, lila villa, raoul pereira, chris rogesr. So [facility] [phone].    ATENCIÓN: Si habla español, tiene a murillo disposición servicios gratuitos de asistencia lingüística. Dion al [phone].    We comply with applicable federal civil rights laws and [place] laws. We do not discriminate on the basis of race, color, national origin, age, disability, sex, sexual orientation, or gender identity.            After Visit Summary       This is your record. Keep this with you and show to your community pharmacist(s) and doctor(s) at your next visit.                  "

## 2018-08-04 NOTE — ED TRIAGE NOTES
Pt c/o head lacerations after a fall from the recliner to the floor. Pt was moving towards the floor to begin praying and fell down. One laceration at the back R of his head and an abrasion R eyebrow area. Pt AOX4 according to . Pt never lost consciousness.

## 2019-04-23 ENCOUNTER — TRANSFERRED RECORDS (OUTPATIENT)
Dept: HEALTH INFORMATION MANAGEMENT | Facility: CLINIC | Age: 69
End: 2019-04-23

## 2020-04-07 ENCOUNTER — TRANSFERRED RECORDS (OUTPATIENT)
Dept: HEALTH INFORMATION MANAGEMENT | Facility: CLINIC | Age: 70
End: 2020-04-07

## 2020-07-22 ENCOUNTER — HOSPITAL ENCOUNTER (EMERGENCY)
Facility: CLINIC | Age: 70
Discharge: HOME | End: 2020-07-22
Attending: FAMILY MEDICINE | Admitting: FAMILY MEDICINE
Payer: COMMERCIAL

## 2020-07-22 VITALS
TEMPERATURE: 98.1 F | HEART RATE: 80 BPM | RESPIRATION RATE: 16 BRPM | DIASTOLIC BLOOD PRESSURE: 77 MMHG | OXYGEN SATURATION: 97 % | BODY MASS INDEX: 24.59 KG/M2 | WEIGHT: 166 LBS | SYSTOLIC BLOOD PRESSURE: 123 MMHG

## 2020-07-22 DIAGNOSIS — R73.9 HYPERGLYCEMIA: ICD-10-CM

## 2020-07-22 DIAGNOSIS — R68.2 DRY MOUTH: ICD-10-CM

## 2020-07-22 LAB
ANION GAP SERPL CALCULATED.3IONS-SCNC: 6 MMOL/L (ref 3–14)
BASOPHILS # BLD AUTO: 0 10E9/L (ref 0–0.2)
BASOPHILS NFR BLD AUTO: 0.4 %
BUN SERPL-MCNC: 13 MG/DL (ref 7–30)
CALCIUM SERPL-MCNC: 8.7 MG/DL (ref 8.5–10.1)
CHLORIDE SERPL-SCNC: 106 MMOL/L (ref 94–109)
CO2 SERPL-SCNC: 25 MMOL/L (ref 20–32)
CREAT SERPL-MCNC: 0.75 MG/DL (ref 0.66–1.25)
DIFFERENTIAL METHOD BLD: NORMAL
EOSINOPHIL # BLD AUTO: 0.2 10E9/L (ref 0–0.7)
EOSINOPHIL NFR BLD AUTO: 4.3 %
ERYTHROCYTE [DISTWIDTH] IN BLOOD BY AUTOMATED COUNT: 13.5 % (ref 10–15)
GFR SERPL CREATININE-BSD FRML MDRD: >90 ML/MIN/{1.73_M2}
GLUCOSE SERPL-MCNC: 277 MG/DL (ref 70–99)
HCT VFR BLD AUTO: 41.9 % (ref 40–53)
HGB BLD-MCNC: 13.7 G/DL (ref 13.3–17.7)
IMM GRANULOCYTES # BLD: 0 10E9/L (ref 0–0.4)
IMM GRANULOCYTES NFR BLD: 0.2 %
LYMPHOCYTES # BLD AUTO: 2.1 10E9/L (ref 0.8–5.3)
LYMPHOCYTES NFR BLD AUTO: 41 %
MCH RBC QN AUTO: 29.3 PG (ref 26.5–33)
MCHC RBC AUTO-ENTMCNC: 32.7 G/DL (ref 31.5–36.5)
MCV RBC AUTO: 90 FL (ref 78–100)
MONOCYTES # BLD AUTO: 0.4 10E9/L (ref 0–1.3)
MONOCYTES NFR BLD AUTO: 7.5 %
NEUTROPHILS # BLD AUTO: 2.4 10E9/L (ref 1.6–8.3)
NEUTROPHILS NFR BLD AUTO: 46.6 %
NRBC # BLD AUTO: 0 10*3/UL
NRBC BLD AUTO-RTO: 0 /100
PLATELET # BLD AUTO: 164 10E9/L (ref 150–450)
POTASSIUM SERPL-SCNC: 4.1 MMOL/L (ref 3.4–5.3)
RBC # BLD AUTO: 4.68 10E12/L (ref 4.4–5.9)
SODIUM SERPL-SCNC: 137 MMOL/L (ref 133–144)
WBC # BLD AUTO: 5.2 10E9/L (ref 4–11)

## 2020-07-22 PROCEDURE — 85025 COMPLETE CBC W/AUTO DIFF WBC: CPT | Performed by: FAMILY MEDICINE

## 2020-07-22 PROCEDURE — 99283 EMERGENCY DEPT VISIT LOW MDM: CPT

## 2020-07-22 PROCEDURE — 80048 BASIC METABOLIC PNL TOTAL CA: CPT | Performed by: FAMILY MEDICINE

## 2020-07-22 PROCEDURE — U0003 INFECTIOUS AGENT DETECTION BY NUCLEIC ACID (DNA OR RNA); SEVERE ACUTE RESPIRATORY SYNDROME CORONAVIRUS 2 (SARS-COV-2) (CORONAVIRUS DISEASE [COVID-19]), AMPLIFIED PROBE TECHNIQUE, MAKING USE OF HIGH THROUGHPUT TECHNOLOGIES AS DESCRIBED BY CMS-2020-01-R: HCPCS | Performed by: FAMILY MEDICINE

## 2020-07-22 PROCEDURE — 99283 EMERGENCY DEPT VISIT LOW MDM: CPT | Mod: Z6 | Performed by: FAMILY MEDICINE

## 2020-07-22 NOTE — ED PROVIDER NOTES
"    [facility] [place] ([facility])    7/22/20        History     Chief Complaint   Patient presents with     Headache     Mouth Problem     Dry mouth     The history is provided by the patient and medical records. The history is limited by a language barrier. A  was used (Official i-Pad [facility] ).     Abdurahman Waday is a 70 year old male with a past medical history significant for an asthma who presents here to the Emergency Department due to a dry mouth.  Patient reports that he has been having a dry mouth when he wakes up in the mornings after rest.  He also notes his lips felt very dry this morning.  He denies fevers, cough, myalgias, sore throat, shortness of breath, vomiting, or diarrhea.  Patient does not believe he is congested.  States his BG yesterday was 150.  He also is sleeping with his mask on.  Admits he has not been following his diabetic diet and eating a lot of \"Togolese bread\".  He is denying abdominal pain, nausea, chest pain, does have some urinary frequency.  No dysuria or urgency.  He is frequently very thirsty.    I have reviewed the Medications, Allergies, Past Medical and Surgical History, and Social History in the [employer] system.  PAST MEDICAL HISTORY:   Past Medical History:   Diagnosis Date     Diabetes (H)      Uncomplicated asthma        PAST SURGICAL HISTORY: History reviewed. No pertinent surgical history.    Past medical history, past surgical history, medications, and allergies were reviewed with the patient. Additional pertinent items: None    FAMILY HISTORY:   Family History   Problem Relation Age of Onset     Glaucoma No family hx of      Macular Degeneration No family hx of        SOCIAL HISTORY:   Social History     Tobacco Use     Smoking status: Former Smoker     Smokeless tobacco: Never Used     Tobacco comment: quit ten years ago   Substance Use Topics     Alcohol use: No     Social history was reviewed with the patient. " Additional pertinent items: None      Patient's Medications   New Prescriptions    No medications on file   Previous Medications    ACETAMINOPHEN (TYLENOL PO)    Take 500 mg by mouth every 6 hours as needed for mild pain or fever (1-2 tabs)    ALBUTEROL (ALBUTEROL) 108 (90 BASE) MCG/ACT INHALER    Inhale 2 puffs into the lungs every 6 hours    ASPIRIN ADULT LOW STRENGTH 81 MG EC TABLET    TAKE 1 TABLET (81 MG) BY MOUTH DAILY    ATORVASTATIN (LIPITOR) 20 MG TABLET    Take 1 tablet (20 mg) by mouth daily    BLOOD GLUCOSE MONITORING (NO BRAND SPECIFIED) METER DEVICE KIT    Use to test blood sugar 4 times daily or as directed.    BLOOD GLUCOSE MONITORING (NO BRAND SPECIFIED) TEST STRIP    Use to test blood sugars 3 times daily.    BUDESONIDE-FORMOTEROL (SYMBICORT) 160-4.5 MCG/ACT INHALER    Inhale 2 puffs into the lungs 2 times daily    HYDROXYZINE (VISTARIL) 50 MG CAPSULE    Take 1 capsule (50 mg) by mouth 3 times daily as needed for itching    ISONIAZID (NYDRAZID) 300 MG TABLET    Take 1 tablet (300 mg) by mouth daily    ISONIAZID (NYDRAZID) 300 MG TABLET    Take 1 tablet (300 mg) by mouth daily    METFORMIN (GLUCOPHAGE) 1000 MG TABLET    Take 1 tablet (1,000 mg) by mouth 2 times daily (with meals)    NAPROXEN (NAPROSYN) 500 MG TABLET    Take 1 tablet (500 mg) by mouth 2 times daily as needed for moderate pain    OMEPRAZOLE 20 MG TABLET    Take 1 tablet (20 mg) by mouth daily    PSYLLIUM HUSK POWD    3 tsp. 3 times daily    RANITIDINE (ZANTAC) 300 MG TABLET    Take 1 tablet (300 mg) by mouth At Bedtime    SENNA (SENOKOT) 8.6 MG TABLET    Take 1 tablet by mouth daily    SITAGLIPTIN (JANUVIA) 25 MG TABLET    Take 1 tablet (25 mg) by mouth daily    TAMSULOSIN (FLOMAX) 0.4 MG CAPSULE    Take 1 capsule (0.4 mg) by mouth daily    TRIAMCINOLONE (KENALOG) 0.1 % OINTMENT    Apply sparingly to affected area three times daily for 14 days.   Modified Medications    No medications on file   Discontinued Medications    No  medications on file          Allergies   Allergen Reactions     Seasonal Allergies         Review of Systems  ROS: 14 point ROS neg other than the symptoms noted above in the HPI.    Physical Exam   BP: 131/65  Pulse: 78  Temp: 98.1  F (36.7  C)  Resp: 16  Weight: 75.3 kg (166 lb)  SpO2: 97 %      Physical Exam  Constitutional:       General: He is not in acute distress.     Appearance: He is not diaphoretic.   HENT:      Head: Atraumatic.      Mouth/Throat:      Mouth: Mucous membranes are dry.   Eyes:      General: No scleral icterus.     Pupils: Pupils are equal, round, and reactive to light.   Cardiovascular:      Rate and Rhythm: Normal rate and regular rhythm.      Heart sounds: Normal heart sounds. No murmur.   Pulmonary:      Effort: No respiratory distress.      Breath sounds: Normal breath sounds.   Abdominal:      General: Bowel sounds are normal.      Palpations: Abdomen is soft.      Tenderness: There is no abdominal tenderness.   Musculoskeletal:         General: No tenderness.   Skin:     General: Skin is warm.      Findings: No rash.   Neurological:      General: No focal deficit present.      Mental Status: He is oriented to person, place, and time.         ED Course   9:42 AM  The patient was seen and examined by Tarun Batres MD in Room ED19.        Procedures                           Results for orders placed or performed during the hospital encounter of 07/22/20 (from the past 24 hour(s))   CBC with platelets differential   Result Value Ref Range    WBC 5.2 4.0 - 11.0 10e9/L    RBC Count 4.68 4.4 - 5.9 10e12/L    Hemoglobin 13.7 13.3 - 17.7 g/dL    Hematocrit 41.9 40.0 - 53.0 %    MCV 90 78 - 100 fl    MCH 29.3 26.5 - 33.0 pg    MCHC 32.7 31.5 - 36.5 g/dL    RDW 13.5 10.0 - 15.0 %    Platelet Count 164 150 - 450 10e9/L    Diff Method Automated Method     % Neutrophils 46.6 %    % Lymphocytes 41.0 %    % Monocytes 7.5 %    % Eosinophils 4.3 %    % Basophils 0.4 %    % Immature Granulocytes 0.2  %    Nucleated RBCs 0 0 /100    Absolute Neutrophil 2.4 1.6 - 8.3 10e9/L    Absolute Lymphocytes 2.1 0.8 - 5.3 10e9/L    Absolute Monocytes 0.4 0.0 - 1.3 10e9/L    Absolute Eosinophils 0.2 0.0 - 0.7 10e9/L    Absolute Basophils 0.0 0.0 - 0.2 10e9/L    Abs Immature Granulocytes 0.0 0 - 0.4 10e9/L    Absolute Nucleated RBC 0.0    Basic metabolic panel   Result Value Ref Range    Sodium 137 133 - 144 mmol/L    Potassium 4.1 3.4 - 5.3 mmol/L    Chloride 106 94 - 109 mmol/L    Carbon Dioxide 25 20 - 32 mmol/L    Anion Gap 6 3 - 14 mmol/L    Glucose 277 (H) 70 - 99 mg/dL    Urea Nitrogen 13 7 - 30 mg/dL    Creatinine 0.75 0.66 - 1.25 mg/dL    GFR Estimate >90 >60 mL/min/[1.73_m2]    GFR Estimate If Black >90 >60 mL/min/[1.73_m2]    Calcium 8.7 8.5 - 10.1 mg/dL     Medications - No data to display          Assessments & Plan (with Medical Decision Making)   An elderly [place] male with a history of diabetes who is presenting with dry mouth and dry lips.  After spending an extensive amount of time, with the assist of 2 different interpreters I cannot elicit any other complaints.  He does have mild hyperglycemia without signs of ketoacidosis or other metabolic derangement or infection.  He is admittedly not always adhering to his diabetic diet.  Also, the patient notes he has been sleeping with the mask on and is fearful about COVID-19.  He is taking p.o. in the ED without difficulty.  I spent some time with the assistance of the  discussing his diabetic diet and the need for him to take his medications which include metformin and Januvia.  He will follow-up with his primary doctor to have his sugar rechecked and to discuss his diabetes further.  I did send COVID-19 testing.  Based on the clinical findings and the entire clinical scenario, the patient appears stable at this time to be treated symptomatically, and to follow-up as an outpatient for any further evaluation and treatment.  Discussed expected course,  need for follow up, and indications for return with the patient.  See discharge instructions.      I have reviewed the nursing notes.    I have reviewed the findings, diagnosis, plan and need for follow up with the patient.    New Prescriptions    No medications on file       Final diagnoses:   Hyperglycemia   Dry mouth     IRichie, am serving as a trained medical scribe to document services personally performed by Tarun Batres MD, based on the provider's statements to me.   ITarun MD, was physically present and have reviewed and verified the accuracy of this note documented by Richie Ball.    7/22/2020   [facility], [place], EMERGENCY DEPARTMENT     Tarun Batres MD  07/22/20 4706

## 2020-07-22 NOTE — ED AVS SNAPSHOT
[facility], [place], Emergency Department  [address]  [place] [address]  Phone:  [phone]  Fax:  [phone]                                    Abdurahman Waday   MRN: [id number]    Department:  [facility], Emergency Department   Date of Visit:  7/22/2020           After Visit Summary Signature Page    I have received my discharge instructions, and my questions have been answered. I have discussed any challenges I see with this plan with the nurse or doctor.    ..........................................................................................................................................  Patient/Patient Representative Signature      ..........................................................................................................................................  Patient Representative Print Name and Relationship to Patient    ..................................................               ................................................  Date                                   Time    ..........................................................................................................................................  Reviewed by Signature/Title    ...................................................              ..............................................  Date                                               Time          22EPIC Rev 08/18

## 2020-07-22 NOTE — LETTER
July 24, 2020        Abbey Waday  [address]   [facility] [address]    This letter provides a written record that you were tested for COVID-19 on  7/21/20      Your result was negative. This means that we didn t find the virus that causes COVID-19 in your sample. A test may show negative when you do actually have the virus. This can happen when the virus is in the early stages of infection, before you feel illness symptoms.    If you have symptoms   Stay home and away from others (self-isolate) until you meet ALL of the guidelines below:    You ve had no fever--and no medicine that reduces fever--for 3 full days (72 hours). And      Your other symptoms have gotten better. For example, your cough or breathing has improved. And     At least 10 days have passed since your symptoms started.    During this time:    Stay home. Don t go to work, school or anywhere else.     Stay in your own room, including for meals. Use your own bathroom if you can.    Stay away from others in your home. No hugging, kissing or shaking hands. No visitors.    Clean  high touch  surfaces often (doorknobs, counters, handles, etc.). Use a household cleaning spray or wipes. You can find a full list on the EPA website at www.epa.gov/pesticide-registration/list-n-disinfectants-use-against-sars-cov-2.    Cover your mouth and nose with a mask, tissue or washcloth to avoid spreading germs.    Wash your hands and face often with soap and water.    Going back to work  Check with your employer for any guidelines to follow for going back to work.    Employers: This document serves as formal notice that your employee tested negative for COVID-19, as of the testing date shown above.

## 2020-07-22 NOTE — DISCHARGE INSTRUCTIONS
Thank you for choosing [facility].     Please closely monitor for further symptoms. Return to the Emergency Department if you develop any new or worsening signs or symptoms.    If you received any opiate pain medications or sedatives during your visit, please do not drive for at least 8 hours.     Labs, cultures or final xray interpretations may still need to be reviewed.  We will call you if your plan of care needs to be changed.    Please follow up with your primary care physician or clinic within the next 5 to 7 days to recheck your blood sugar, and reassess your diabetic medications.  Please follow your diabetic diet instructions closely.  You do not need to wear your mask when you are sleeping at night, only when you go out in public or are around other people.

## 2020-07-23 LAB
SARS-COV-2 RNA SPEC QL NAA+PROBE: NOT DETECTED
SPECIMEN SOURCE: NORMAL

## 2021-03-11 ENCOUNTER — TRANSFERRED RECORDS (OUTPATIENT)
Dept: HEALTH INFORMATION MANAGEMENT | Facility: CLINIC | Age: 71
End: 2021-03-11

## 2021-08-05 ENCOUNTER — TRANSCRIBE ORDERS (OUTPATIENT)
Dept: OTHER | Age: 71
End: 2021-08-05

## 2021-08-05 DIAGNOSIS — Z01.00 ROUTINE EYE EXAM: Primary | ICD-10-CM

## 2021-08-30 ENCOUNTER — OFFICE VISIT (OUTPATIENT)
Dept: OPHTHALMOLOGY | Facility: CLINIC | Age: 71
End: 2021-08-30
Attending: OPHTHALMOLOGY
Payer: COMMERCIAL

## 2021-08-30 DIAGNOSIS — Z96.1 PSEUDOPHAKIA, LEFT EYE: ICD-10-CM

## 2021-08-30 DIAGNOSIS — H25.811 COMBINED FORM OF AGE-RELATED CATARACT, RIGHT EYE: ICD-10-CM

## 2021-08-30 DIAGNOSIS — H52.13 MYOPIC ASTIGMATISM OF BOTH EYES: ICD-10-CM

## 2021-08-30 DIAGNOSIS — E11.65 TYPE 2 DIABETES MELLITUS WITH HYPERGLYCEMIA, WITHOUT LONG-TERM CURRENT USE OF INSULIN (H): ICD-10-CM

## 2021-08-30 DIAGNOSIS — H35.3290 NEOVASCULAR AGE-RELATED MACULAR DEGENERATION (H): Primary | ICD-10-CM

## 2021-08-30 DIAGNOSIS — H52.203 MYOPIC ASTIGMATISM OF BOTH EYES: ICD-10-CM

## 2021-08-30 DIAGNOSIS — H52.4 PRESBYOPIA: ICD-10-CM

## 2021-08-30 PROCEDURE — 92134 CPTRZ OPH DX IMG PST SGM RTA: CPT | Mod: GZ | Performed by: OPHTHALMOLOGY

## 2021-08-30 PROCEDURE — 92015 DETERMINE REFRACTIVE STATE: CPT

## 2021-08-30 PROCEDURE — G0463 HOSPITAL OUTPT CLINIC VISIT: HCPCS

## 2021-08-30 PROCEDURE — 92004 COMPRE OPH EXAM NEW PT 1/>: CPT | Performed by: OPHTHALMOLOGY

## 2021-08-30 RX ORDER — CHOLECALCIFEROL (VITAMIN D3) 50 MCG
TABLET ORAL
COMMUNITY
Start: 2021-07-27

## 2021-08-30 RX ORDER — BLOOD-GLUCOSE METER
EACH MISCELLANEOUS
COMMUNITY
Start: 2020-07-27

## 2021-08-30 RX ORDER — BLOOD SUGAR DIAGNOSTIC
STRIP MISCELLANEOUS
COMMUNITY
Start: 2019-10-23

## 2021-08-30 RX ORDER — SITAGLIPTIN AND METFORMIN HYDROCHLORIDE 1000; 50 MG/1; MG/1
TABLET, FILM COATED ORAL
COMMUNITY
Start: 2021-08-24

## 2021-08-30 RX ORDER — CANAGLIFLOZIN 300 MG/1
TABLET, FILM COATED ORAL
COMMUNITY
Start: 2021-08-24

## 2021-08-30 RX ORDER — SILDENAFIL 50 MG/1
50-75 TABLET, FILM COATED ORAL
COMMUNITY
Start: 2021-04-01

## 2021-08-30 RX ORDER — TAMSULOSIN HYDROCHLORIDE 0.4 MG/1
0.8 CAPSULE ORAL
COMMUNITY
Start: 2019-10-23

## 2021-08-30 RX ORDER — ESOMEPRAZOLE MAGNESIUM 40 MG/1
40 CAPSULE, DELAYED RELEASE ORAL
COMMUNITY
Start: 2020-09-11

## 2021-08-30 RX ORDER — ATORVASTATIN CALCIUM 40 MG/1
TABLET, FILM COATED ORAL
COMMUNITY
Start: 2020-09-12

## 2021-08-30 RX ORDER — LANCETS 23 GAUGE
EACH MISCELLANEOUS
COMMUNITY
Start: 2019-10-23

## 2021-08-30 ASSESSMENT — TONOMETRY
IOP_METHOD: TONOPEN
OS_IOP_MMHG: 10
OD_IOP_MMHG: 11

## 2021-08-30 ASSESSMENT — VISUAL ACUITY
METHOD: SNELLEN - LINEAR
OS_PH_CC: 20/300 ECCENTRIC
OD_PH_CC: 20/60
CORRECTION_TYPE: GLASSES
OD_CC: 20/100

## 2021-08-30 ASSESSMENT — REFRACTION_WEARINGRX
OS_CYLINDER: SPHERE
SPECS_TYPE: BIFOCAL
OS_ADD: +2.50
OD_SPHERE: -0.75
OD_ADD: +2.50
OS_SPHERE: -1.25
OD_CYLINDER: +1.00
OD_AXIS: 180

## 2021-08-30 ASSESSMENT — REFRACTION_MANIFEST
OS_AXIS: 170
OS_CYLINDER: +1.25
OS_CYLINDER: SPHERE
OS_SPHERE: -0.50
OS_SPHERE: -1.25

## 2021-08-30 ASSESSMENT — EXTERNAL EXAM - LEFT EYE: OS_EXAM: NORMAL

## 2021-08-30 ASSESSMENT — SLIT LAMP EXAM - LIDS
COMMENTS: NORMAL
COMMENTS: NORMAL

## 2021-08-30 ASSESSMENT — CONF VISUAL FIELD
OD_NORMAL: 1
OS_NORMAL: 1

## 2021-08-30 ASSESSMENT — EXTERNAL EXAM - RIGHT EYE: OD_EXAM: NORMAL

## 2021-08-30 NOTE — PROGRESS NOTES
HPI:  Abdurahman O Waday is a 71 year old male here for diabetic eye exam. He states that he has had worsening blurred vision in both eyes, left eye worse than right eye, that occurred 3 weeks go. He used to be able to read with that eye, and now he can't see much at all. No pain, redness, discharge. No flashes/floaters.    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 On oral hypoglycemics - he does not know what else besides metformin. Not on insulin. Macular atrophy more consistent with AMD than DR.  Plan: Discussed the importance of tight blood glucose control in the prevention of diabetic retinopathy. Recommend yearly dilated eye exam.    (H25.811) Combined form of age-related cataract, right eye  Comment: May be visually significant with BCVA of 20/60, but retina also limiting.  Plan: Monitor for now.    (Z96.1) Pseudophakia, left eye  Comment: Clear visual axis  Plan: Observe    (H52.203,  H52.13) Myopic astigmatism of both eyes  (H52.4) Presbyopia  Comment: Vision limited by above.  Plan: Hold off on updating glasses Rx.      -----------------------------------------------------------------------------------    Patient disposition:   Return in about 1 week (around 9/6/2021) for retina clinic referral, or sooner as needed.    Teaching statement:  Complete documentation of historical and exam elements from today's encounter can be found in the full encounter summary report (not reduplicated in this progress note). I personally obtained the chief complaint(s) and history of present illness.  I confirmed and edited as necessary the review of systems, past medical/surgical  history, family history, social history, and examination findings as documented by others; and I examined the patient myself. I personally reviewed the relevant tests, images, and reports as documented above.     I formulated and edited as necessary the assessment and plan and discussed the findings and management plan with the patient and family.      Carrol Villegas MD  Comprehensive Ophthalmology & Ocular Pathology  Department of Ophthalmology and Visual Neurosciences  [email or web address]  Pager [phone]

## 2021-08-30 NOTE — NURSING NOTE
Chief Complaints and History of Present Illnesses   Patient presents with     Diabetic Eye Exam      Chief Complaint(s) and History of Present Illness(es)     Diabetic Eye Exam     Vision: is blurred for distance, is blurred for near and is worsening    Associated symptoms: blurred vision.  Negative for flashes and floaters    Diabetes Type: Type 2    Treatments tried: no treatments    Pain scale: 0/10              Comments     New patient diabetic eye exam.  Referral per Dr. Derian Archer DO.  Says 3 weeks ago decreased vision both eyes.  History of : IOL left eye in 2020.  Says last A1C was 7.5.  Say blood sugars controlled 127-132 in mornings.  Lab Results       Component                Value               Date                       A1C                      8.3                 05/24/2017                 A1C                      8.3                 04/19/2017                 A1C                      8.5                 02/28/2017                 A1C                      7.8                 09/15/2016                 A1C                      7.7                 04/19/2016            Eye meds: none  FÉLIX Dhillon 8/30/2021 8:48 AM

## 2021-08-31 DIAGNOSIS — H35.3231 EXUDATIVE AGE-RELATED MACULAR DEGENERATION, BILATERAL, WITH ACTIVE CHOROIDAL NEOVASCULARIZATION (H): ICD-10-CM

## 2021-08-31 DIAGNOSIS — H35.3290 NEOVASCULAR AGE-RELATED MACULAR DEGENERATION (H): Primary | ICD-10-CM

## 2021-09-14 ENCOUNTER — OFFICE VISIT (OUTPATIENT)
Dept: OPHTHALMOLOGY | Facility: CLINIC | Age: 71
End: 2021-09-14
Attending: OPHTHALMOLOGY
Payer: COMMERCIAL

## 2021-09-14 DIAGNOSIS — H25.811 COMBINED FORM OF AGE-RELATED CATARACT, RIGHT EYE: ICD-10-CM

## 2021-09-14 DIAGNOSIS — H35.3231 EXUDATIVE AGE-RELATED MACULAR DEGENERATION, BILATERAL, WITH ACTIVE CHOROIDAL NEOVASCULARIZATION (H): Primary | ICD-10-CM

## 2021-09-14 DIAGNOSIS — E11.65 TYPE 2 DIABETES MELLITUS WITH HYPERGLYCEMIA, WITHOUT LONG-TERM CURRENT USE OF INSULIN (H): ICD-10-CM

## 2021-09-14 DIAGNOSIS — Z96.1 PSEUDOPHAKIA, LEFT EYE: ICD-10-CM

## 2021-09-14 DIAGNOSIS — H35.3231 EXUDATIVE AGE-RELATED MACULAR DEGENERATION, BILATERAL, WITH ACTIVE CHOROIDAL NEOVASCULARIZATION (H): ICD-10-CM

## 2021-09-14 PROCEDURE — 92134 CPTRZ OPH DX IMG PST SGM RTA: CPT | Performed by: OPHTHALMOLOGY

## 2021-09-14 PROCEDURE — 99207 FUNDUS AUTOFLUORESCENCE IMAGE (FAF) OU (BOTH EYES): CPT | Performed by: OPHTHALMOLOGY

## 2021-09-14 PROCEDURE — 92235 FLUORESCEIN ANGRPH MLTIFRAME: CPT | Performed by: OPHTHALMOLOGY

## 2021-09-14 PROCEDURE — 92250 FUNDUS PHOTOGRAPHY W/I&R: CPT | Performed by: OPHTHALMOLOGY

## 2021-09-14 PROCEDURE — 92240 ICG ANGIOGRAPHY I&R UNI/BI: CPT | Performed by: OPHTHALMOLOGY

## 2021-09-14 PROCEDURE — 92015 DETERMINE REFRACTIVE STATE: CPT

## 2021-09-14 PROCEDURE — 92012 INTRM OPH EXAM EST PATIENT: CPT | Performed by: OPHTHALMOLOGY

## 2021-09-14 PROCEDURE — G0463 HOSPITAL OUTPT CLINIC VISIT: HCPCS | Mod: 25

## 2021-09-14 PROCEDURE — 99207 FLUORESCEIN ANGIOGRAPHY OU (BOTH EYES): CPT | Mod: 26 | Performed by: OPHTHALMOLOGY

## 2021-09-14 ASSESSMENT — VISUAL ACUITY
METHOD: SNELLEN - LINEAR
OD_SC: 20/70
OD_SC+: +2
OS_SC: 20/400
OS_SC+: +/-

## 2021-09-14 ASSESSMENT — TONOMETRY
IOP_METHOD: TONOPEN
OS_IOP_MMHG: 17
OD_IOP_MMHG: 19

## 2021-09-14 ASSESSMENT — CONF VISUAL FIELD
OD_NORMAL: 1
METHOD: COUNTING FINGERS
OS_NORMAL: 1

## 2021-09-14 ASSESSMENT — REFRACTION_MANIFEST
OD_AXIS: 010
OD_SPHERE: -1.25
OS_CYLINDER: +2.00
OD_CYLINDER: +2.00
OS_AXIS: 170
OS_SPHERE: -0.50
OD_ADD: +2.50
OS_ADD: +2.50

## 2021-09-14 ASSESSMENT — EXTERNAL EXAM - LEFT EYE: OS_EXAM: NORMAL

## 2021-09-14 ASSESSMENT — SLIT LAMP EXAM - LIDS
COMMENTS: NORMAL
COMMENTS: NORMAL

## 2021-09-14 ASSESSMENT — EXTERNAL EXAM - RIGHT EYE: OD_EXAM: NORMAL

## 2021-09-14 NOTE — PROGRESS NOTES
CC: macular degeneration     HPI:  Abdurahman O Waday is a 71 year old male referred by Dr. Villegas for neovascular AMD. Notes a hx of DM II for many yrs. He states that he has had worsening blurred vision in both eyes, left eye worse than right eye, that occurred 3 weeks go. He used to be able to read with that eye, and now he can't see much at all. No pain, redness, discharge. No flashes/floaters.    POH: S/p CE/IOL left eye in 2020  PMH: Type 2 DM    OCT 9/14/21  each eye double layer sign: occult CNV; drusen and PD; left eye elevated PED (possibly hemorrhagic, old)  FA/ICGA 9/14/21  Numerous hyperF stippling with some of them mildly leaking; no major lesion under the left eye lesion    Assessment & Plan     1. Neovascular age-related macular degeneration ou  Diffuse psuedodrusen and drusen each eye  Noted in charts from 2015  Acute vision loss ~1 yr ago; received no treatment    Today in exam and OCT: Macular DLS (occult CNV) with large elevated PED (possibly old hemorrhagic) temporal macula left eye with some SRF  Differentials for subretinal/subRPE deposits (DLS) include: PVRL (but AMD is more likely because of the long presence of drusen and PD)    Plan for avastin injection ou (will come for injection next week); discussed that visual prognosis is likely poor especially left eye  Continue AREDS II Supplements  Will discuss Amsler grid next visit     2. Type 2 diabetes mellitus  Last A1c 7.5 per patient. No signs of DR  Discussed the importance of tight blood glucose control in the prevention of diabetic retinopathy. Recommend yearly dilated eye exam.    3. Combined form of age-related cataract, right eye  Followed by Dr. Villegas; repeat evaluation once retina stabilized    4. Pseudophakia, left eye  Observe      Dispo: intravitreal avastin each eye next week and DFE and OCT in 4 w     Complete documentation of historical and exam elements from today's encounter can be found in the full encounter summary report  (not reduplicated in this progress note). I personally obtained the chief complaint(s) and history of present illness.  I confirmed and edited as necessary the review of systems, past medical/surgical history, family history, social history, and examination findings as documented by others; and I examined the patient myself. I personally reviewed the relevant tests, images, and reports as documented above. I formulated and edited as necessary the assessment and plan and discussed the findings and management plan with the patient and family.    Rudolph Vann MD, PhD

## 2021-09-14 NOTE — LETTER
9/14/2021       RE: Abdurahman O Waday  [address]  [facility] [address]     Dear Colleague,    Thank you for referring your patient, Abdurahman O Waday, to the [facility] at [facility]. Please see a copy of my visit note below.    CC: macular degeneration     HPI:  Abdurahman O Waday is a 71 year old male referred by Dr. Villegas for neovascular AMD. Notes a hx of DM II for many yrs. He states that he has had worsening blurred vision in both eyes, left eye worse than right eye, that occurred 3 weeks go. He used to be able to read with that eye, and now he can't see much at all. No pain, redness, discharge. No flashes/floaters.    POH: S/p CE/IOL left eye in 2020  PMH: Type 2 DM    OCT 9/14/21  each eye double layer sign: occult CNV; drusen and PD; left eye elevated PED (possibly hemorrhagic, old)  FA/ICGA 9/14/21  Numerous hyperF stippling with some of them mildly leaking; no major lesion under the left eye lesion    Assessment & Plan     1. Neovascular age-related macular degeneration ou  Diffuse psuedodrusen and drusen each eye  Noted in charts from 2015  Acute vision loss ~1 yr ago; received no treatment    Today in exam and OCT: Macular DLS (occult CNV) with large elevated PED (possibly old hemorrhagic) temporal macula left eye with some SRF  Differentials for subretinal/subRPE deposits (DLS) include: PVRL (but AMD is more likely because of the long presence of drusen and PD)    Plan for avastin injection ou (will come for injection next week); discussed that visual prognosis is likely poor especially left eye  Continue AREDS II Supplements  Will discuss Amsler grid next visit     2. Type 2 diabetes mellitus  Last A1c 7.5 per patient. No signs of DR  Discussed the importance of tight blood glucose control in the prevention of diabetic retinopathy. Recommend yearly dilated eye exam.    3. Combined form of age-related cataract, right  eye  Followed by Dr. Villegas; repeat evaluation once retina stabilized    4. Pseudophakia, left eye  Observe      Dispo: intravitreal avastin each eye next week and DFE and OCT in 4 w     Complete documentation of historical and exam elements from today's encounter can be found in the full encounter summary report (not reduplicated in this progress note). I personally obtained the chief complaint(s) and history of present illness.  I confirmed and edited as necessary the review of systems, past medical/surgical history, family history, social history, and examination findings as documented by others; and I examined the patient myself. I personally reviewed the relevant tests, images, and reports as documented above. I formulated and edited as necessary the assessment and plan and discussed the findings and management plan with the patient and family.    Rudolph Vann MD, PhD

## 2021-10-05 ENCOUNTER — ALLIED HEALTH/NURSE VISIT (OUTPATIENT)
Dept: OPHTHALMOLOGY | Facility: CLINIC | Age: 71
End: 2021-10-05
Attending: OPHTHALMOLOGY
Payer: COMMERCIAL

## 2021-10-05 DIAGNOSIS — H35.3231 EXUDATIVE AGE-RELATED MACULAR DEGENERATION, BILATERAL, WITH ACTIVE CHOROIDAL NEOVASCULARIZATION (H): Primary | ICD-10-CM

## 2021-10-05 PROCEDURE — 999N000103 HC STATISTIC NO CHARGE FACILITY FEE

## 2021-10-05 PROCEDURE — 67028 INJECTION EYE DRUG: CPT | Mod: LT | Performed by: OPHTHALMOLOGY

## 2021-10-05 PROCEDURE — 67028 INJECTION EYE DRUG: CPT | Mod: RT | Performed by: OPHTHALMOLOGY

## 2021-10-05 PROCEDURE — 250N000011 HC RX IP 250 OP 636: Performed by: OPHTHALMOLOGY

## 2021-10-05 RX ADMIN — Medication 1.25 MG: at 15:33

## 2021-10-05 RX ADMIN — Medication 1.25 MG: at 15:32

## 2021-10-05 ASSESSMENT — VISUAL ACUITY
OD_SC+: -2
OS_SC: 20/400
OS_PH_SC: 20/200
METHOD: SNELLEN - LINEAR
OD_SC: 20/70

## 2021-10-05 ASSESSMENT — REFRACTION_WEARINGRX
OD_CYLINDER: +1.00
OD_ADD: +2.50
SPECS_TYPE: BIFOCAL
OS_ADD: +2.50
OS_CYLINDER: SPHERE
OD_AXIS: 180
OD_SPHERE: -0.75
OS_SPHERE: -1.25

## 2021-10-05 ASSESSMENT — CONF VISUAL FIELD
OD_NORMAL: 1
OS_NORMAL: 1
METHOD: COUNTING FINGERS

## 2021-10-05 ASSESSMENT — TONOMETRY
OS_IOP_MMHG: 12
IOP_METHOD: TONOPEN
OD_IOP_MMHG: 15

## 2021-10-05 NOTE — PROGRESS NOTES
intravitreal avastin each eye today     CC: macular degeneration     HPI:  Abdurahman O Waday is a 71 year old male referred by Dr. Villegas for neovascular AMD. Notes a hx of DM II for many yrs. He states that he has had worsening blurred vision in both eyes, left eye worse than right eye, that occurred 3 weeks go. He used to be able to read with that eye, and now he can't see much at all. No pain, redness, discharge. No flashes/floaters.    POH: S/p CE/IOL left eye in 2020  PMH: Type 2 DM    OCT 9/14/21  each eye double layer sign: occult CNV; drusen and PD; left eye elevated PED (possibly hemorrhagic, old)  FA/ICGA 9/14/21  Numerous hyperF stippling with some of them mildly leaking; no major lesion under the left eye lesion    Assessment & Plan     1. Neovascular age-related macular degeneration ou  Diffuse psuedodrusen and drusen each eye  Noted in charts from 2015  Acute vision loss ~1 yr ago; received no treatment    Today in exam and OCT: Macular DLS (occult CNV) with large elevated PED (possibly old hemorrhagic) temporal macula left eye with some SRF  Differentials for subretinal/subRPE deposits (DLS) include: PVRL (but AMD is more likely because of the long presence of drusen and PD)    Plan for avastin injection ou (will come for injection next week); discussed that visual prognosis is likely poor especially left eye  Continue AREDS II Supplements  Will discuss Amsler grid next visit     2. Type 2 diabetes mellitus  Last A1c 7.5 per patient. No signs of DR  Discussed the importance of tight blood glucose control in the prevention of diabetic retinopathy. Recommend yearly dilated eye exam.    3. Combined form of age-related cataract, right eye  Followed by Dr. Villegas; repeat evaluation once retina stabilized    4. Pseudophakia, left eye  Observe      Dispo: DFE and OCT in 4 w     Complete documentation of historical and exam elements from today's encounter can be found in the full encounter summary report  (not reduplicated in this progress note). I personally obtained the chief complaint(s) and history of present illness.  I confirmed and edited as necessary the review of systems, past medical/surgical history, family history, social history, and examination findings as documented by others; and I examined the patient myself. I personally reviewed the relevant tests, images, and reports as documented above. I formulated and edited as necessary the assessment and plan and discussed the findings and management plan with the patient and family.    Rudolph Vann MD, PhD

## 2021-10-05 NOTE — NURSING NOTE
Chief Complaints and History of Present Illnesses   Patient presents with     Macular Degeneration Follow Up     3 week follow up     Chief Complaint(s) and History of Present Illness(es)     Macular Degeneration Follow Up     Comments: 3 week follow up              Comments     Pt here with  over the phone.  Pt states vision improving since last week.  No eye pain today. No redness or dryness.    DM2 BS: 125 this morning per pt.  Lab Results       Component                Value               Date                       A1C                      8.3                 05/24/2017                 A1C                      8.3                 04/19/2017                 A1C                      8.5                 02/28/2017                 A1C                      7.8                 09/15/2016                 A1C                      7.7                 04/19/2016              CYNDI Morgan October 5, 2021 2:50 PM

## 2021-10-28 DIAGNOSIS — H35.3231 EXUDATIVE AGE-RELATED MACULAR DEGENERATION, BILATERAL, WITH ACTIVE CHOROIDAL NEOVASCULARIZATION (H): Primary | ICD-10-CM

## 2021-11-02 ENCOUNTER — ALLIED HEALTH/NURSE VISIT (OUTPATIENT)
Dept: OPHTHALMOLOGY | Facility: CLINIC | Age: 71
End: 2021-11-02
Attending: OPHTHALMOLOGY
Payer: COMMERCIAL

## 2021-11-02 DIAGNOSIS — E11.65 TYPE 2 DIABETES MELLITUS WITH HYPERGLYCEMIA, WITHOUT LONG-TERM CURRENT USE OF INSULIN (H): Primary | ICD-10-CM

## 2021-11-02 DIAGNOSIS — H35.3231 EXUDATIVE AGE-RELATED MACULAR DEGENERATION, BILATERAL, WITH ACTIVE CHOROIDAL NEOVASCULARIZATION (H): ICD-10-CM

## 2021-11-02 DIAGNOSIS — H25.811 COMBINED FORM OF AGE-RELATED CATARACT, RIGHT EYE: ICD-10-CM

## 2021-11-02 DIAGNOSIS — Z96.1 PSEUDOPHAKIA, LEFT EYE: ICD-10-CM

## 2021-11-02 PROCEDURE — 92014 COMPRE OPH EXAM EST PT 1/>: CPT | Performed by: OPHTHALMOLOGY

## 2021-11-02 PROCEDURE — 92134 CPTRZ OPH DX IMG PST SGM RTA: CPT | Performed by: OPHTHALMOLOGY

## 2021-11-02 PROCEDURE — G0463 HOSPITAL OUTPT CLINIC VISIT: HCPCS

## 2021-11-02 ASSESSMENT — REFRACTION_MANIFEST
OD_ADD: +3.00
OD_CYLINDER: +1.50
OD_AXIS: 180
OD_SPHERE: -1.25
OS_ADD: +3.00
OS_ADD: +3.00

## 2021-11-02 ASSESSMENT — VISUAL ACUITY
OD_SC: 20/70
METHOD: SNELLEN - LINEAR
OS_SC: 20/200
OD_SC+: -1
OS_PH_SC: 20/150

## 2021-11-02 ASSESSMENT — SLIT LAMP EXAM - LIDS
COMMENTS: NORMAL
COMMENTS: NORMAL

## 2021-11-02 ASSESSMENT — EXTERNAL EXAM - LEFT EYE: OS_EXAM: NORMAL

## 2021-11-02 ASSESSMENT — TONOMETRY
OD_IOP_MMHG: 14
OS_IOP_MMHG: 13
IOP_METHOD: TONOPEN

## 2021-11-02 ASSESSMENT — CONF VISUAL FIELD
OD_NORMAL: 1
OS_NORMAL: 1

## 2021-11-02 ASSESSMENT — EXTERNAL EXAM - RIGHT EYE: OD_EXAM: NORMAL

## 2021-11-02 NOTE — PROGRESS NOTES
CC: macular degeneration     Interval hx: s/p avastin ou 10/5/2021; vision is stable     HPI:  Abdurahman O Waday is a 71 year old male referred by Dr. Villegas for neovascular AMD. Notes a hx of DM II for many yrs. He states that he has had worsening blurred vision in both eyes, left eye worse than right eye, that occurred 3 weeks go. He used to be able to read with that eye, and now he can't see much at all. No pain, redness, discharge. No flashes/floaters.    POH: S/p CE/IOL left eye in 2020  PMH: Type 2 DM    OCT 11/2/21  each eye double layer sign: occult CNV; drusen and PD; left eye less elevated PED with resolved SRF    FA/ICGA 9/14/21  Numerous hyperF stippling with some of them mildly leaking; no major lesion under the left eye lesion    Assessment & Plan     1. Neovascular age-related macular degeneration ou  Diffuse psuedodrusen and drusen each eye  Noted in charts from 2015  Acute vision loss ~1 yr ago; received no treatment    Today in exam and OCT: Macular DLS (occult CNV) with large elevated PED (possibly old hemorrhagic) temporal macula left eye with some SRF  Differentials for subretinal/subRPE deposits (DLS) include: PVRL (but AMD is more likely because of the long presence of drusen and PD)    S/P avastin injection ou 10/5/2021; discussed that visual prognosis is likely poor especially left eye  Improved OCT 11/2/21 with no SRF left eye   Continue AREDS II Supplements  Amsler grid education given  RTC 3 months    2. Type 2 diabetes mellitus  Last A1c 7.5 per patient. No signs of DR  Discussed the importance of tight blood glucose control in the prevention of diabetic retinopathy. Recommend yearly dilated eye exam.    3. Combined form of age-related cataract, right eye  Followed by Dr. Villegas; repeat evaluation once retina stabilized    4. Pseudophakia, left eye  Observe      Dispo: DFE and OCT in 3 months     Complete documentation of historical and exam elements from today's encounter can be  found in the full encounter summary report (not reduplicated in this progress note). I personally obtained the chief complaint(s) and history of present illness.  I confirmed and edited as necessary the review of systems, past medical/surgical history, family history, social history, and examination findings as documented by others; and I examined the patient myself. I personally reviewed the relevant tests, images, and reports as documented above. I formulated and edited as necessary the assessment and plan and discussed the findings and management plan with the patient and family.    Rudolph Vann MD, PhD           Statement Selected

## 2021-11-03 NOTE — PROGRESS NOTES
"Per covering CM, mailed client an \"Unable to Contact\" letter.    Marisela Forbes  Case Management Specialist  [facility]   [phone]    " 97.9

## 2022-03-01 DIAGNOSIS — H35.3231 EXUDATIVE AGE-RELATED MACULAR DEGENERATION, BILATERAL, WITH ACTIVE CHOROIDAL NEOVASCULARIZATION (H): Primary | ICD-10-CM

## 2024-02-14 NOTE — NURSING NOTE
"    Instructions Relayed to Patient by Virtual Roomer:     Patient is active on [employer]:   Relayed following to patient: \"It looks like you are active on [employer], are you able to join the visit this way? If not, do you need us to send you a link now or would you like your provider to send a link via text or email when they are ready to initiate the visit?\"    Reminded patient to ensure they were logged on to virtual visit by arrival time listed. Documented in appointment notes if patient had flexibility to initiate visit sooner than arrival time. If pediatric virtual visit, ensured pediatric patient along with parent/guardian will be present for video visit.     Patient offered the website [email or web address] and/or phone number to [employer] Help line: [phone]    Domitila is a 53 year old who is being evaluated via a billable video visit.      How would you like to obtain your AVS? [employer]  If the video visit is dropped, the invitation should be resent by: Text to cell phone: [phone]  Will anyone else be joining your video visit? No      Assessment & Plan     Chronic diarrhea  Reviewed negative lab results and colonoscopy with no concern for colon polyps or colitis   reviewed surgical pathology results showing normal colonic mucosa  Reassured patient, recommended to start taking Metamucil 1-2 times a day take Imodium, 3 times daily as needed for diarrhea, can take up to 4 pills a day  Follow low fiber diet  Follow-up as needed  Recommended patient to keep a food diary and avoid triggering foods.  Recommended consult GI if symptoms do not improve as expected in 2 to 3 months or sooner if needed  Referral made today  -Adult GI  referral      Benign essential hypertension  BP Readings from Last 6 Encounters:   02/01/24 (!) 143/86   01/29/24 126/89   12/20/23 120/80   11/08/23 (!) 147/91   05/03/23 116/80   04/17/23 (!) 160/90     Reviewed multiple blood pressure numbers on 2/1/2024 all " Chief Complaints and History of Present Illnesses   Patient presents with     Follow Up     Neovascular age-related macular degeneration     Chief Complaint(s) and History of Present Illness(es)     Follow Up     Laterality: both eyes    Course: stable    Associated symptoms: tearing.  Negative for dryness, eye pain and headache    Pain scale: 0/10    Comments: Neovascular age-related macular degeneration              Comments     Patient was referred by Dr Villegas for macular degeneration.  He states that his vision has seemed stable since his last exam.      His last A1c was 7.7 taken on 04/01/2021 per patient's Care Everywhere chart.    Anita Vargas, RAMESH 2:32 PM  September 14, 2021                    in normal range except for the last reading of 143/86 consistent with the patient's home blood pressure readings  She is currently on lisinopril 10 mg daily  Due to concern for side effects of dry cough, will discontinue lisinopril and switch to losartan 25 mg daily  We will follow-up blood pressure recheck at the physical visit in 3 months  Will follow low salt diet, weight loss and regular exercises..  Continue with home blood pressure monitoring, if blood pressure numbers remain above 140/90 in 2 to 3 weeks, patient to send us a [employer] message.  Patient verbalised understanding and is agreeable to the plan.    - losartan (COZAAR) 25 MG tablet; Take 1 tablet (25 mg) by mouth daily This is replacing lisinopril    Dry cough  -Mostly at night that wakes her up from sleep that started after increasing the dose of lisinopril from 5 to 10 mg daily a month ago,  Allergies updated  Stop lisinopril and switch to losartan  Expect resolution of symptoms in a few weeks if dry cough continues for more than a month after stopping medication, patient will follow-up      Review of the result(s) of each unique test - colonoscopy, CBC, BMP          Chart documentation done in part with Dragon Voice recognition Software. Although reviewed after completion, some word and grammatical error may remain.    See Patient Instructions    Dayanara Ramesh is a 53 year old, presenting for the following health issues:  Recheck Medication, Hypertension, and Results (Colonoscopy)  Patient is here for a video visit instead of in person visit due to the current COVID-19 pandemic.  Patient is here to review the results of colonoscopy that was done to workup on her chronic diarrhea  Patient continues to have postprandial diarrhea few times a day  Please refer to the previous visit notes regarding the same.  Also patient reports having a dry cough since she increase the dose of postoperative from 5 to 10 mg daily  Denies shortness of breath,  fever, chills, other URI symptoms  Has no history of allergies, asthma        2/14/2024     1:56 PM   Additional Questions   Roomed by Buck MOHAN   Accompanied by Self     History of Present Illness       Hypertension: She presents for follow up of hypertension.  She does check blood pressure  regularly outside of the clinic. Outside blood pressures have been over 140/90. She follows a low salt diet.         Medication Followup of Lisinopril  Taking Medication as prescribed: yes  Side Effects:  coughing a lot  Medication Helping Symptoms:  yes- Has been helping keep her numbers low, but occasionally she will spike higher blood pressures  155/108 when she woke up in the morning 02/09  168/96 In the morning, prior to medication dosage increase 01/15    150/103 02/10        Review of Systems  CONSTITUTIONAL: NEGATIVE for fever, chills, change in weight  ENT/MOUTH: NEGATIVE for ear, mouth and throat problems  RESP: Dry cough  CV: NEGATIVE for chest pain, palpitations or peripheral edema  CV: History of hypertension  GI: Chronic diarrhea  MUSCULOSKELETAL: NEGATIVE for significant arthralgias or myalgia  NEURO: History of migraine  ENDOCRINE: NEGATIVE for temperature intolerance, skin/hair changes  HEME/ALLERGY/IMMUNE: NEGATIVE for bleeding problems  PSYCHIATRIC: NEGATIVE for changes in mood or affect      Objective           Vitals:  No vitals were obtained today due to virtual visit.    Physical Exam   GENERAL: alert and no distress  EYES: Eyes grossly normal to inspection  RESP: No audible wheeze, cough, or visible cyanosis.    NEURO: Cranial nerves grossly intact.  Mentation and speech appropriate for age.  PSYCH: Appropriate affect, tone, and pace of words          Video-Visit Details    Type of service:  Video Visit     Originating Location (pt. Location): Home    Distant Location (provider location):  Off-site  Platform used for Video Visit: [employer]an  Signed Electronically by: Marcos Ko MD

## 2024-06-11 NOTE — TELEPHONE ENCOUNTER
Telephone call to patient, unable to leave a message.     Anna Moreira RN  [facility]     No

## 2025-07-18 NOTE — OR NURSING
Preop instructions confirmed with Dr Montana office, including holding GLP1 meds-they have and will recall pt-time of arrival, NPO status, etc. Pt repeatedly hung up on calls from [facility] , including interpretors

## 2025-07-22 RX ORDER — DOCUSATE SODIUM 100 MG/1
100 CAPSULE, LIQUID FILLED ORAL 2 TIMES DAILY
COMMUNITY

## 2025-07-22 RX ORDER — POLYETHYLENE GLYCOL 3350 17 G/17G
1 POWDER, FOR SOLUTION ORAL DAILY
COMMUNITY

## 2025-07-23 ENCOUNTER — ANESTHESIA (OUTPATIENT)
Dept: SURGERY | Facility: CLINIC | Age: 75
End: 2025-07-23
Payer: COMMERCIAL

## 2025-07-23 ENCOUNTER — VIRTUAL VISIT (OUTPATIENT)
Dept: INTERPRETER SERVICES | Facility: CLINIC | Age: 75
End: 2025-07-23

## 2025-07-23 ENCOUNTER — HOSPITAL ENCOUNTER (OUTPATIENT)
Facility: CLINIC | Age: 75
Discharge: HOME | End: 2025-07-23
Attending: UROLOGY | Admitting: UROLOGY
Payer: COMMERCIAL

## 2025-07-23 ENCOUNTER — ANESTHESIA EVENT (OUTPATIENT)
Dept: SURGERY | Facility: CLINIC | Age: 75
End: 2025-07-23
Payer: COMMERCIAL

## 2025-07-23 VITALS
OXYGEN SATURATION: 98 % | BODY MASS INDEX: 25.46 KG/M2 | TEMPERATURE: 97.2 F | WEIGHT: 162.2 LBS | HEART RATE: 70 BPM | DIASTOLIC BLOOD PRESSURE: 69 MMHG | RESPIRATION RATE: 16 BRPM | HEIGHT: 67 IN | SYSTOLIC BLOOD PRESSURE: 121 MMHG

## 2025-07-23 DIAGNOSIS — N40.1 BPH WITH URINARY OBSTRUCTION: Primary | ICD-10-CM

## 2025-07-23 DIAGNOSIS — E11.9 TYPE 2 DIABETES MELLITUS WITHOUT COMPLICATION, WITHOUT LONG-TERM CURRENT USE OF INSULIN (H): ICD-10-CM

## 2025-07-23 DIAGNOSIS — N13.8 BPH WITH URINARY OBSTRUCTION: Primary | ICD-10-CM

## 2025-07-23 LAB
GLUCOSE BLDC GLUCOMTR-MCNC: 104 MG/DL (ref 70–99)
GLUCOSE BLDC GLUCOMTR-MCNC: 116 MG/DL (ref 70–99)

## 2025-07-23 PROCEDURE — 258N000001 HC RX 258: Performed by: UROLOGY

## 2025-07-23 PROCEDURE — 250N000009 HC RX 250: Performed by: UROLOGY

## 2025-07-23 PROCEDURE — 272N000002 HC OR SUPPLY OTHER OPNP: Performed by: UROLOGY

## 2025-07-23 PROCEDURE — 258N000003 HC RX IP 258 OP 636

## 2025-07-23 PROCEDURE — 250N000013 HC RX MED GY IP 250 OP 250 PS 637: Performed by: STUDENT IN AN ORGANIZED HEALTH CARE EDUCATION/TRAINING PROGRAM

## 2025-07-23 PROCEDURE — 710N000010 HC RECOVERY PHASE 1, LEVEL 2, PER MIN: Performed by: UROLOGY

## 2025-07-23 PROCEDURE — T1013 SIGN LANG/ORAL INTERPRETER: HCPCS | Mod: U4,TEL,95

## 2025-07-23 PROCEDURE — C1758 CATHETER, URETERAL: HCPCS | Performed by: UROLOGY

## 2025-07-23 PROCEDURE — 82962 GLUCOSE BLOOD TEST: CPT

## 2025-07-23 PROCEDURE — 710N000012 HC RECOVERY PHASE 2, PER MINUTE: Performed by: UROLOGY

## 2025-07-23 PROCEDURE — 272N000001 HC OR GENERAL SUPPLY STERILE: Performed by: UROLOGY

## 2025-07-23 PROCEDURE — 360N000077 HC SURGERY LEVEL 4, PER MIN: Performed by: UROLOGY

## 2025-07-23 PROCEDURE — 250N000013 HC RX MED GY IP 250 OP 250 PS 637: Performed by: UROLOGY

## 2025-07-23 PROCEDURE — 250N000009 HC RX 250

## 2025-07-23 PROCEDURE — 250N000011 HC RX IP 250 OP 636: Performed by: STUDENT IN AN ORGANIZED HEALTH CARE EDUCATION/TRAINING PROGRAM

## 2025-07-23 PROCEDURE — 370N000017 HC ANESTHESIA TECHNICAL FEE, PER MIN: Performed by: UROLOGY

## 2025-07-23 PROCEDURE — 250N000009 HC RX 250: Performed by: ANESTHESIOLOGY

## 2025-07-23 PROCEDURE — 258N000003 HC RX IP 258 OP 636: Performed by: ANESTHESIOLOGY

## 2025-07-23 PROCEDURE — 88344 IMHCHEM/IMCYTCHM EA MLT ANTB: CPT | Mod: TC | Performed by: UROLOGY

## 2025-07-23 PROCEDURE — 250N000011 HC RX IP 250 OP 636

## 2025-07-23 PROCEDURE — 999N000141 HC STATISTIC PRE-PROCEDURE NURSING ASSESSMENT: Performed by: UROLOGY

## 2025-07-23 RX ORDER — ONDANSETRON 2 MG/ML
4 INJECTION INTRAMUSCULAR; INTRAVENOUS EVERY 30 MIN PRN
Status: DISCONTINUED | OUTPATIENT
Start: 2025-07-23 | End: 2025-07-23 | Stop reason: HOSPADM

## 2025-07-23 RX ORDER — ASPIRIN 81 MG/1
81 TABLET, COATED ORAL DAILY
Qty: 90 TABLET | Refills: 0 | Status: SHIPPED | OUTPATIENT
Start: 2025-07-23

## 2025-07-23 RX ORDER — LIDOCAINE HYDROCHLORIDE 20 MG/ML
INJECTION, SOLUTION INFILTRATION; PERINEURAL PRN
Status: DISCONTINUED | OUTPATIENT
Start: 2025-07-23 | End: 2025-07-23

## 2025-07-23 RX ORDER — TOLTERODINE TARTRATE 2 MG/1
2 TABLET, EXTENDED RELEASE ORAL ONCE
Status: COMPLETED | OUTPATIENT
Start: 2025-07-23 | End: 2025-07-23

## 2025-07-23 RX ORDER — FENTANYL CITRATE 50 UG/ML
INJECTION, SOLUTION INTRAMUSCULAR; INTRAVENOUS PRN
Status: DISCONTINUED | OUTPATIENT
Start: 2025-07-23 | End: 2025-07-23

## 2025-07-23 RX ORDER — GENTAMICIN SULFATE 80 MG/100ML
80 INJECTION, SOLUTION INTRAVENOUS ONCE
Status: COMPLETED | OUTPATIENT
Start: 2025-07-23 | End: 2025-07-23

## 2025-07-23 RX ORDER — MAGNESIUM HYDROXIDE 1200 MG/15ML
LIQUID ORAL PRN
Status: DISCONTINUED | OUTPATIENT
Start: 2025-07-23 | End: 2025-07-23 | Stop reason: HOSPADM

## 2025-07-23 RX ORDER — OXYCODONE HYDROCHLORIDE 5 MG/1
10 TABLET ORAL
Status: DISCONTINUED | OUTPATIENT
Start: 2025-07-23 | End: 2025-07-23 | Stop reason: HOSPADM

## 2025-07-23 RX ORDER — FENTANYL CITRATE 0.05 MG/ML
25 INJECTION, SOLUTION INTRAMUSCULAR; INTRAVENOUS EVERY 5 MIN PRN
Status: DISCONTINUED | OUTPATIENT
Start: 2025-07-23 | End: 2025-07-23 | Stop reason: HOSPADM

## 2025-07-23 RX ORDER — DEXAMETHASONE SODIUM PHOSPHATE 4 MG/ML
4 INJECTION, SOLUTION INTRA-ARTICULAR; INTRALESIONAL; INTRAMUSCULAR; INTRAVENOUS; SOFT TISSUE
Status: DISCONTINUED | OUTPATIENT
Start: 2025-07-23 | End: 2025-07-23 | Stop reason: HOSPADM

## 2025-07-23 RX ORDER — CEFAZOLIN SODIUM/WATER 2 G/20 ML
2 SYRINGE (ML) INTRAVENOUS SEE ADMIN INSTRUCTIONS
Status: DISCONTINUED | OUTPATIENT
Start: 2025-07-23 | End: 2025-07-23 | Stop reason: HOSPADM

## 2025-07-23 RX ORDER — MEPERIDINE HYDROCHLORIDE 25 MG/ML
12.5 INJECTION INTRAMUSCULAR; INTRAVENOUS; SUBCUTANEOUS EVERY 5 MIN PRN
Status: DISCONTINUED | OUTPATIENT
Start: 2025-07-23 | End: 2025-07-23 | Stop reason: HOSPADM

## 2025-07-23 RX ORDER — LEVOFLOXACIN 500 MG/1
500 TABLET, FILM COATED ORAL DAILY
Qty: 7 TABLET | Refills: 0 | Status: SHIPPED | OUTPATIENT
Start: 2025-07-23 | End: 2025-07-30

## 2025-07-23 RX ORDER — IPRATROPIUM BROMIDE AND ALBUTEROL SULFATE 2.5; .5 MG/3ML; MG/3ML
3 SOLUTION RESPIRATORY (INHALATION)
Status: DISCONTINUED | OUTPATIENT
Start: 2025-07-23 | End: 2025-07-23 | Stop reason: HOSPADM

## 2025-07-23 RX ORDER — PROPOFOL 10 MG/ML
INJECTION, EMULSION INTRAVENOUS CONTINUOUS PRN
Status: DISCONTINUED | OUTPATIENT
Start: 2025-07-23 | End: 2025-07-23

## 2025-07-23 RX ORDER — ACETAMINOPHEN 325 MG/1
975 TABLET ORAL ONCE
Status: COMPLETED | OUTPATIENT
Start: 2025-07-23 | End: 2025-07-23

## 2025-07-23 RX ORDER — FENTANYL CITRATE 0.05 MG/ML
50 INJECTION, SOLUTION INTRAMUSCULAR; INTRAVENOUS EVERY 5 MIN PRN
Status: DISCONTINUED | OUTPATIENT
Start: 2025-07-23 | End: 2025-07-23 | Stop reason: HOSPADM

## 2025-07-23 RX ORDER — ACETAMINOPHEN 325 MG/1
650 TABLET ORAL ONCE
Status: COMPLETED | OUTPATIENT
Start: 2025-07-23 | End: 2025-07-23

## 2025-07-23 RX ORDER — OXYBUTYNIN CHLORIDE 10 MG/1
10 TABLET, EXTENDED RELEASE ORAL ONCE
Status: COMPLETED | OUTPATIENT
Start: 2025-07-23 | End: 2025-07-23

## 2025-07-23 RX ORDER — SODIUM CHLORIDE, SODIUM LACTATE, POTASSIUM CHLORIDE, CALCIUM CHLORIDE 600; 310; 30; 20 MG/100ML; MG/100ML; MG/100ML; MG/100ML
INJECTION, SOLUTION INTRAVENOUS CONTINUOUS
Status: DISCONTINUED | OUTPATIENT
Start: 2025-07-23 | End: 2025-07-23 | Stop reason: HOSPADM

## 2025-07-23 RX ORDER — HYDROMORPHONE HCL IN WATER/PF 6 MG/30 ML
0.2 PATIENT CONTROLLED ANALGESIA SYRINGE INTRAVENOUS EVERY 5 MIN PRN
Status: DISCONTINUED | OUTPATIENT
Start: 2025-07-23 | End: 2025-07-23 | Stop reason: HOSPADM

## 2025-07-23 RX ORDER — ACETAMINOPHEN 650 MG/1
650 SUPPOSITORY RECTAL ONCE
Status: COMPLETED | OUTPATIENT
Start: 2025-07-23 | End: 2025-07-23

## 2025-07-23 RX ORDER — OXYCODONE HYDROCHLORIDE 5 MG/1
5 TABLET ORAL
Status: DISCONTINUED | OUTPATIENT
Start: 2025-07-23 | End: 2025-07-23 | Stop reason: HOSPADM

## 2025-07-23 RX ORDER — ONDANSETRON 2 MG/ML
INJECTION INTRAMUSCULAR; INTRAVENOUS PRN
Status: DISCONTINUED | OUTPATIENT
Start: 2025-07-23 | End: 2025-07-23

## 2025-07-23 RX ORDER — HYDROMORPHONE HCL IN WATER/PF 6 MG/30 ML
0.4 PATIENT CONTROLLED ANALGESIA SYRINGE INTRAVENOUS EVERY 5 MIN PRN
Status: DISCONTINUED | OUTPATIENT
Start: 2025-07-23 | End: 2025-07-23 | Stop reason: HOSPADM

## 2025-07-23 RX ORDER — PHENAZOPYRIDINE HYDROCHLORIDE 200 MG/1
200 TABLET, FILM COATED ORAL 3 TIMES DAILY PRN
COMMUNITY
Start: 2025-07-23 | End: 2025-07-26

## 2025-07-23 RX ORDER — ONDANSETRON 4 MG/1
4 TABLET, ORALLY DISINTEGRATING ORAL EVERY 30 MIN PRN
Status: DISCONTINUED | OUTPATIENT
Start: 2025-07-23 | End: 2025-07-23 | Stop reason: HOSPADM

## 2025-07-23 RX ORDER — PROPOFOL 10 MG/ML
INJECTION, EMULSION INTRAVENOUS PRN
Status: DISCONTINUED | OUTPATIENT
Start: 2025-07-23 | End: 2025-07-23

## 2025-07-23 RX ORDER — NALOXONE HYDROCHLORIDE 0.4 MG/ML
0.1 INJECTION, SOLUTION INTRAMUSCULAR; INTRAVENOUS; SUBCUTANEOUS
Status: DISCONTINUED | OUTPATIENT
Start: 2025-07-23 | End: 2025-07-23 | Stop reason: HOSPADM

## 2025-07-23 RX ORDER — ACETAMINOPHEN 325 MG/1
975 TABLET ORAL
Status: DISCONTINUED | OUTPATIENT
Start: 2025-07-23 | End: 2025-07-23 | Stop reason: HOSPADM

## 2025-07-23 RX ORDER — FENTANYL CITRATE 0.05 MG/ML
25 INJECTION, SOLUTION INTRAMUSCULAR; INTRAVENOUS
Status: DISCONTINUED | OUTPATIENT
Start: 2025-07-23 | End: 2025-07-23 | Stop reason: HOSPADM

## 2025-07-23 RX ORDER — CEFAZOLIN SODIUM/WATER 2 G/20 ML
2 SYRINGE (ML) INTRAVENOUS
Status: COMPLETED | OUTPATIENT
Start: 2025-07-23 | End: 2025-07-23

## 2025-07-23 RX ADMIN — ONDANSETRON 4 MG: 2 INJECTION INTRAMUSCULAR; INTRAVENOUS at 09:20

## 2025-07-23 RX ADMIN — Medication 2 G: at 07:35

## 2025-07-23 RX ADMIN — HYDROMORPHONE HYDROCHLORIDE 0.5 MG: 1 INJECTION, SOLUTION INTRAMUSCULAR; INTRAVENOUS; SUBCUTANEOUS at 09:29

## 2025-07-23 RX ADMIN — PROPOFOL 50 MG: 10 INJECTION, EMULSION INTRAVENOUS at 09:13

## 2025-07-23 RX ADMIN — SODIUM CHLORIDE, SODIUM LACTATE, POTASSIUM CHLORIDE, AND CALCIUM CHLORIDE: .6; .31; .03; .02 INJECTION, SOLUTION INTRAVENOUS at 11:10

## 2025-07-23 RX ADMIN — PHENYLEPHRINE HYDROCHLORIDE 200 MCG: 10 INJECTION INTRAVENOUS at 08:07

## 2025-07-23 RX ADMIN — TRANEXAMIC ACID 1 G: 1 INJECTION, SOLUTION INTRAVENOUS at 09:20

## 2025-07-23 RX ADMIN — SODIUM CHLORIDE, SODIUM LACTATE, POTASSIUM CHLORIDE, AND CALCIUM CHLORIDE: .6; .31; .03; .02 INJECTION, SOLUTION INTRAVENOUS at 07:14

## 2025-07-23 RX ADMIN — PHENYLEPHRINE HYDROCHLORIDE 100 MCG: 10 INJECTION INTRAVENOUS at 07:51

## 2025-07-23 RX ADMIN — PROPOFOL 50 MG: 10 INJECTION, EMULSION INTRAVENOUS at 09:15

## 2025-07-23 RX ADMIN — OXYBUTYNIN CHLORIDE 10 MG: 10 TABLET, EXTENDED RELEASE ORAL at 07:03

## 2025-07-23 RX ADMIN — SODIUM CHLORIDE 3000 ML: 900 IRRIGANT IRRIGATION at 11:13

## 2025-07-23 RX ADMIN — PHENYLEPHRINE HYDROCHLORIDE 0.35 MCG/KG/MIN: 10 INJECTION INTRAVENOUS at 08:01

## 2025-07-23 RX ADMIN — TOLTERODINE TARTRATE 2 MG: 2 TABLET, FILM COATED ORAL at 11:13

## 2025-07-23 RX ADMIN — IPRATROPIUM BROMIDE AND ALBUTEROL SULFATE 3 ML: .5; 3 SOLUTION RESPIRATORY (INHALATION) at 09:42

## 2025-07-23 RX ADMIN — PROPOFOL 125 MCG/KG/MIN: 10 INJECTION, EMULSION INTRAVENOUS at 07:42

## 2025-07-23 RX ADMIN — PROPOFOL 200 MG: 10 INJECTION, EMULSION INTRAVENOUS at 07:40

## 2025-07-23 RX ADMIN — PHENYLEPHRINE HYDROCHLORIDE 200 MCG: 10 INJECTION INTRAVENOUS at 07:57

## 2025-07-23 RX ADMIN — FENTANYL CITRATE 25 MCG: 50 INJECTION INTRAMUSCULAR; INTRAVENOUS at 08:45

## 2025-07-23 RX ADMIN — FENTANYL CITRATE 25 MCG: 50 INJECTION INTRAMUSCULAR; INTRAVENOUS at 08:26

## 2025-07-23 RX ADMIN — ACETAMINOPHEN 975 MG: 325 TABLET ORAL at 07:03

## 2025-07-23 RX ADMIN — FENTANYL CITRATE 25 MCG: 50 INJECTION INTRAMUSCULAR; INTRAVENOUS at 07:44

## 2025-07-23 RX ADMIN — PHENYLEPHRINE HYDROCHLORIDE 200 MCG: 10 INJECTION INTRAVENOUS at 09:23

## 2025-07-23 RX ADMIN — LIDOCAINE HYDROCHLORIDE 100 MG: 20 INJECTION, SOLUTION INFILTRATION; PERINEURAL at 07:40

## 2025-07-23 RX ADMIN — FENTANYL CITRATE 25 MCG: 50 INJECTION INTRAMUSCULAR; INTRAVENOUS at 08:42

## 2025-07-23 RX ADMIN — GENTAMICIN SULFATE 80 MG: 80 INJECTION, SOLUTION INTRAVENOUS at 07:10

## 2025-07-23 RX ADMIN — PHENYLEPHRINE HYDROCHLORIDE 100 MCG: 10 INJECTION INTRAVENOUS at 09:21

## 2025-07-23 ASSESSMENT — ACTIVITIES OF DAILY LIVING (ADL)
ADLS_ACUITY_SCORE: 43
ADLS_ACUITY_SCORE: 43
ADLS_ACUITY_SCORE: 41
ADLS_ACUITY_SCORE: 43
ADLS_ACUITY_SCORE: 43
ADLS_ACUITY_SCORE: 41
ADLS_ACUITY_SCORE: 43
ADLS_ACUITY_SCORE: 43

## 2025-07-23 NOTE — DISCHARGE INSTRUCTIONS
You underwent a Holmium laser enucleation of the prostate.     FOLLOW-UP:   Pathology results from your procedure should be available in one to two business days. If you are not contacted regarding these results, please contact Dr. Cotto's team     You are requested to make a follow-up appointment with  in three months with a uroflow study, urine analysis, and urine culture performed prior to the appointment. Please call Dr. Cotto's appointment office to schedule this appointment at least two months in advance. Follow-up will be with either Dr. Cotto or her physician assistant.     Any general Urology questions can be addressed by Dr. Cotto's team     You will need to contact our teams for all questions or concerns regarding insurance, disability paperwork, or FMLA forms.     GENERAL:     If you experience any fevers> 100.4, significant nausea I vomiting, or significant worsening of pain, please contact us at the number below.     It is common to experience recurrent blood in your urine for several months after surgery. Although there should be a general trend of decreasing bleeding over time, it is not uncommon for bleeding to recur after periods of no bleeding. If you notice passage of clots, you should increase your liquid intake in order to reduce the number of clots encountered. If the bleeding continues to worsen with inability to pass clots, inability to urinate, or you experience significant light-headedness, please contact us at the number above.     Burning with urination, or dysuria, is common after this procedure. This may occur at any time of the urinary stream. This will continue to improve over time.     It is common to temporary urinary leakage after this procedure. This will continue to improve over time. You may additionally perform Kegel exercises with every void to help build the muscles at the base of the bladder.     You are restricted from lifting greater than 10 pounds for 2  weeks from the date of surgery until the urine is consistently clear.     You may resume driving once you are able to perform the activities of driving without pain.     Do not use aspirin or aspirin-containing products for at least one week after surgery.     You may resume taking a shower and bathing.     You may travel by airplane or ground transportation after discharge. A short walk is recommended at least once every hour during long journeys.     Avoid sexual intercourse for one week from the date of surgery. It is common to experience bleeding with ejaculation during the healing period.     Avoid activities which place pressure in the pelvic area such as bicycling for 6 weeks from the date of surgery.     It is common to experience mild, recurrent blood in your urine including small blood clots. If the bleeding continues to worsen with passage of large clots, you are unable to urinate, or you experience significant light headedness, please contact the urology service.     PAIN MANAGEMENT:     In general, use acetaminophen (Tylenol Extra Strength) one to two tablets every six hours for pain.     It is best to take pain medications before your pain becomes severe. This will allow you to take less medication yet have better pain relief. For the first 2 or 3 days it may be helpful to take your pain medications on a regular schedule (e.g. every 4 to 6 hours). This will help you to keep your pain under better control. You should then begin to take fewer medications each day until you no longer need them.     Do not consume more than 4000 mg or 4 gm of Tylenol or acetaminophen-containing products within a 24 hour time period to avoid damage to your liver.       Today you were given 975 mg of Tylenol at 7:05am on 7/23/25. The recommended daily maximum dose is 4000 mg.      Same Day Surgery Discharge Instructions for  Sedation and General Anesthesia     It's not unusual to feel dizzy, light-headed or faint for up to  24 hours after surgery or while taking pain medication.  If you have these symptoms: sit for a few minutes before standing and have someone assist you when you get up to walk or use the bathroom.    You should rest and relax for the next 24 hours. We recommend you make arrangements to have an adult stay with you for at least 24 hours after your discharge.  Avoid hazardous and strenuous activity.    DO NOT DRIVE any vehicle or operate mechanical equipment for 24 hours following the end of your surgery.  Even though you may feel normal, your reactions may be affected by the medication you have received.    Do not drink alcoholic beverages for 24 hours following surgery.     Slowly progress to your regular diet as you feel able. It's not unusual to feel nauseated and/or vomit after receiving anesthesia.  If you develop these symptoms, drink clear liquids (apple juice, ginger ale, broth, 7-up, etc. ) until you feel better.  If your nausea and vomiting persists for 24 hours, please notify your surgeon.      All narcotic pain medications, along with inactivity and anesthesia, can cause constipation. Drinking plenty of liquids and increasing fiber intake will help.    For any questions of a medical nature, call your surgeon.    Do not make important decisions for 24 hours.    If you had general anesthesia, you may have a sore throat for a couple of days related to the breathing tube used during surgery.  You may use Cepacol lozenges to help with this discomfort.  If it worsens or if you develop a fever, contact your surgeon.     If you feel your pain is not well managed with the pain medications prescribed by your surgeon, please contact your surgeon's office to let them know so they can address your concerns.     **If you have questions or concerns about your procedure,   call Dr. Cotto at [phone]**

## 2025-07-23 NOTE — PROGRESS NOTES
CBI stopped per protocol. Pt resting calmly, phone  used. MD stopped at bedside to speak with pt and answer questions. Vss, pt on room air.

## 2025-07-23 NOTE — INTERVAL H&P NOTE
I seen and evaluated the patient in the preop area.  I once again reviewed the risk, benefits, and alternatives of procedure including risk of bleeding, infection, and risk of damage to adjacent structures.  He understands that he will need a Fields catheter for at least 24 hours after the surgery.  3% chance of post op admission to the hospital.  He has a prescheduled UCO/VT for tomorrow.  Discussed typical postoperative convalescence.  Patient will avoid heavy lifting for 2 weeks after surgery.      Questions answered the best my ability.  Patient was consented for procedure

## 2025-07-23 NOTE — OP NOTE
Pre-op Dx: BPH with lower urinary tract symptoms, urinary retention  Post-op Dx: Same    Findings  Successful HoLEP   Total Enucleation Time: 52 minutes 00 seconds   Total Morcellation Time: 17 minutes 00 seconds.   Preoperative Prostate Size: 179 cubic centimeters.   Tissue obtained: 135 g  kJ used: 128.52   Laser Settings Used: Enucleation 2 J, 40 Hz. Hemostasis 1.2 J, 30 Hz.    Complications: none  Blood loss: 20 cc    Narrative:  The patient was brought to OR #30 and after good general anesthesia was achieved, the patient was prepped and draped in dorsal lithotomy position. All pressure points were padded.     The patient's bladder was first entered with a 22-Cypriot rigid cystoscope with 0-degree lens. Cystoscopic examination showed normal anterior urethra. The posterior urethra showed bilobar enlargement of the prostate. Both ureteral orifices were identified away from the bladder neck. The rest of the examination was normal. The cystoscope was removed and the meatus was calibrated to 28-Cypriot using sounds. The urethra was then filled with lubricant gel and a 26-Cypriot StorNetPlenish continuous-flow resectoscope was placed. The holmium laser apparatus was placed through the sheath. Using a 550-micron end-firing laser fiber with 10 cm of cladding stripped off, a laser bridge, and a ?-Cypriot laser stabilizing catheter, the procedure was started with the above-mentioned laser setting.     A whole gland nucleation technique was performed. Initial incisions were made the posterior bladder wall near the verumontanum. Capsule was identified in the posterior plane was developed. Meticulous dissection was undertaken at the apex of the prostate at the interface between the prostatic tissue and the sphincter muscle. Mucosa was scored circumferentially and the incision was deepened until the lateral planes met the posterior plane which was previously developed. Anterior mucosal bridge was incised and the anterior surface of the  transition zone was developed in the 6 o'clock position from apex to bladder neck. Once the bladder neck was identified. The lateral aspects of the bladder neck were identified. These were deepened until they met the previously dissected lateral lobes on both the right and left side. Finally the bladder was entered and the bladder neck fibers were dissected away from the prostatic gland. At this point the posterior lateral attachments of the transition zone were developed from apex to bladder neck until the entire lobe was freed from its surrounding attachments.     Once the lobe was fully enucleated, the laser was defocused to get meticulous percise hemostasis on any remaining sources of bleeeding. There was good hemostasis at the conclusion of the procedure.     The laser bridge apparatus and the inner portion of the resectoscope were then removed and the offset Storz nephroscope and Pan - Danny tissue morcellator were then introduced and used to completely morcellate the tissue. Two inflows were used during this portion of the procedure to fully distend the bladder and to prevent any inadvertent bladder injury. The bladder was then ellik'd out and reinspected showing no residual adenomas. Hemostasis was rechecked at the conclusion of the procedure and both ureteral orifices were confirmed and identified well away from the area of resection. No residual adenoma could be identified.     A three-way 22 Fr Fields catheter on a guide was placed into the bladder and the balloon was filled with 70 cc. The bladder was irrigated and continuous bladder irrigation was initiated during recovery. The patient received 0 mg of IV Lasix at the conclusion of the case. Tranexamic acid was given.  The patient tolerated the procedure well and was transfered to recovery in good condition.     Plan:   Full continuous bladder irrigation for 1 hour   Remove tension from Fields catheter in 1 hour and slowly titrate the irrigant fluid  until the irrigation was clamped.   Watch patient's urine for 1 hour, if light cherry or better, patient can be discharged   The patient is having darker colored urine with clots, please contact service and the patient will likely be admitted for observation

## 2025-07-23 NOTE — OR NURSING
Dr. Cotto at bedside to see pt.  Pt to have catheter removed tomorrow 7/24 at 0930am.   [facility] #[id number] utilized

## 2025-07-23 NOTE — ANESTHESIA CARE TRANSFER NOTE
Patient: Abdurahman O Waday    Procedure: Procedure(s):  Cystoscopy  with holmium laser enucleation of the prostate       Diagnosis: Benign prostatic hyperplasia with lower urinary tract symptoms [N40.1]  Diagnosis Additional Information: No value filed.    Anesthesia Type:   General     Note:    Oropharynx: oropharynx clear of all foreign objects and spontaneously breathing  Level of Consciousness: drowsy  Oxygen Supplementation: face mask  Level of Supplemental Oxygen (L/min / FiO2): 6  Independent Airway: airway patency satisfactory and stable  Dentition: dentition unchanged  Vital Signs Stable: post-procedure vital signs reviewed and stable  Report to RN Given: handoff report given  Patient transferred to: PACU  Comments: Upon arrival to PACU patient noted to have shallow breathing not responsive to jaw thrust/airway opening maneuvers. On auscultation tight/wheezy breath ounds noted. Dr. Mack notified. Duo-neb administered. Improvement of symptoms and breathing quality. All questions and concerns addressed. Patient in stable condition upon handoff.    Handoff Report: Identifed the Patient, Identified the Reponsible Provider, Reviewed the pertinent medical history, Discussed the surgical course, Reviewed Intra-OP anesthesia mangement and issues during anesthesia, Set expectations for post-procedure period and Allowed opportunity for questions and acknowledgement of understanding      Vitals:  Vitals Value Taken Time   /56 07/23/25 09:45   Temp 35.5  C (95.9  F) 07/23/25 09:46   Pulse 82 07/23/25 09:46   Resp 19 07/23/25 09:46   SpO2 98 % 07/23/25 09:46   Vitals shown include unfiled device data.    Electronically Signed By: PAM Mcginnis CRNA  July 23, 2025  9:48 AM

## 2025-07-23 NOTE — OR NURSING
Meets criteria for discharge.  Discharge instructions reviewed with pt and pt's designated responsible party.  Pt label on prescription bag from pharmacy matched to pt's wristband. Pharmacy bag opened with 2 prescriptions inside. Medications were reviewed to match pt wristband while pt and significant other agreed with identification. Prescriptions placed back in pharmacy bag resealed with tape and given to daughter per pt request.

## 2025-07-23 NOTE — ANESTHESIA PROCEDURE NOTES
Airway    Staff -        CRNA: Pascual Rivas APRN CRNA       Performed By: CRNAIndications and Patient Condition       Indications for airway management: garry-procedural       Induction type:intravenous       Mask difficulty assessment: 0 - not attempted    Final Airway Details       Final airway type: supraglottic airway    Supraglottic Airway Details        Type: LMA       Brand: I-Gel       LMA size: 5    Post intubation assessment        Placement verified by: capnometry, equal breath sounds and chest rise        Number of attempts at approach: 1       Number of other approaches attempted: 0       Secured with: tape       Ease of procedure: easy       Dentition: Intact and Unchanged

## 2025-07-23 NOTE — PROGRESS NOTES
Tension released on fish per order and CBI slowed. Urine light pink in color, no clots noted. Pt calm and alert w/o complaint. Vss.

## 2025-07-23 NOTE — ANESTHESIA PREPROCEDURE EVALUATION
Anesthesia Pre-Procedure Evaluation    Patient: Abdurahman O Waday   MRN: [id number] : 1950          Procedure : Procedure(s):  Cystoscopy  with holmium laser enucleation of the prostate         Past Medical History:   Diagnosis Date    Asthma     Bacteriuria     BPH (benign prostatic hyperplasia)     Dyslipidemia     GERD (gastroesophageal reflux disease)     HTN (hypertension)     Hyperlipidemia     Lower urinary tract infection     LTBI (latent tuberculosis infection)     Retention of urine     Sepsis (H)     Type 2 diabetes mellitus (H)     Vitamin D deficiency       Past Surgical History:   Procedure Laterality Date    CATARACT IOL, RT/LT Left     [facility]MAN PHACOEMULSIFICATION CLEAR CORNEAL INTRAOCULAR LENS IMPLANT Right 2023      Allergies   Allergen Reactions    Seasonal Allergies       Social History     Tobacco Use    Smoking status: Never     Passive exposure: Never    Smokeless tobacco: Never    Tobacco comments:     quit ten years ago   Substance Use Topics    Alcohol use: No      Wt Readings from Last 1 Encounters:   25 73.6 kg (162 lb 3.2 oz)        Anesthesia Evaluation   Pt has had prior anesthetic.     No history of anesthetic complications       ROS/MED HX  ENT/Pulmonary: Comment: Latent TB infection    (+)                      asthma                  Neurologic:       Cardiovascular:     (+) Dyslipidemia hypertension- -   -  - -                                      METS/Exercise Tolerance:     Hematologic:       Musculoskeletal:       GI/Hepatic:     (+) GERD,                   Renal/Genitourinary:       Endo:     (+)  type II DM,                    Psychiatric/Substance Use:       Infectious Disease:       Malignancy:       Other:              Physical Exam  Airway  Mallampati: II  TM distance: >3 FB  Neck ROM: limited    Cardiovascular   Rhythm: regular     Dental   (+) Multiple crowns, permanent bridges      Pulmonary Breath sounds clear to auscultation        Neurological  "  He appears awake, alert and oriented x3.    Other Findings       OUTSIDE LABS:  CBC:   Lab Results   Component Value Date    WBC 5.2 07/22/2020    HGB 13.7 07/22/2020    HCT 41.9 07/22/2020     07/22/2020     BMP:   Lab Results   Component Value Date     07/22/2020     02/28/2017    POTASSIUM 4.1 07/22/2020    POTASSIUM 4.3 02/28/2017    CHLORIDE 106 07/22/2020    CHLORIDE 103 02/28/2017    CO2 25 07/22/2020    CO2 29 02/28/2017    BUN 13 07/22/2020    BUN 13 02/28/2017    CR 0.75 07/22/2020    CR 0.71 02/28/2017     (H) 07/23/2025     (H) 07/22/2020     COAGS: No results found for: \"PTT\", \"INR\", \"FIBR\"  POC: No results found for: \"BGM\", \"HCG\", \"HCGS\"  HEPATIC:   Lab Results   Component Value Date    ALBUMIN 3.8 02/28/2017    PROTTOTAL 7.5 02/28/2017    ALT 29 02/28/2017    AST 21 02/28/2017    ALKPHOS 111 02/28/2017    BILITOTAL 0.6 02/28/2017     OTHER:   Lab Results   Component Value Date    A1C 8.3 (H) 05/24/2017    AIME 8.7 07/22/2020    TSH 0.55 04/19/2017       Anesthesia Plan    ASA Status:  2       Anesthesia Type: General.  Airway: oral.  Induction: intravenous.   Techniques and Equipment:       - Monitoring Plan: standard ASA monitoring     Consents    Anesthesia Plan(s) and associated risks, benefits, and realistic alternatives discussed. Questions answered and patient/representative(s) expressed understanding.     - Discussed:     - Discussed with:  Patient,                Postoperative Care         Comments:    Other Comments: Avoid andres Mack MD    I have reviewed the pertinent notes and labs in the chart from the past 30 days and (re)examined the patient.  Any updates or changes from those notes are reflected in this note.    Clinically Significant Risk Factors Present on Admission                 # Drug Induced Platelet Defect: home medication list includes an antiplatelet medication             # Overweight: Estimated body " "mass index is 25.4 kg/m  as calculated from the following:    Height as of this encounter: 1.702 m (5' 7\").    Weight as of this encounter: 73.6 kg (162 lb 3.2 oz).       # Asthma: noted on problem list              "

## 2025-07-23 NOTE — OR NURSING
"Pt is speaking English and states \" I speak general English.\" Interpeter being used had to care for patient intially needed to go. Pt declined use of another  at this time.  "

## 2025-07-23 NOTE — ANESTHESIA POSTPROCEDURE EVALUATION
Patient: Abdurahman O Waday    Procedure: Procedure(s):  Cystoscopy  with holmium laser enucleation of the prostate       Anesthesia Type:  General    Note:  Disposition: Outpatient   Postop Pain Control: Uneventful            Sign Out: Well controlled pain   PONV: No   Neuro/Psych: Uneventful            Sign Out: Acceptable/Baseline neuro status   Airway/Respiratory: Uneventful            Sign Out: Acceptable/Baseline resp. status   CV/Hemodynamics: Uneventful            Sign Out: Acceptable CV status; No obvious hypovolemia; No obvious fluid overload   Other NRE:    DID A NON-ROUTINE EVENT OCCUR?     Event details/Postop Comments:  Stridor initially when patient arrived to PACU, resolved with duoneb       Last vitals:  Vitals Value Taken Time   /68 07/23/25 11:15   Temp 35.3  C (95.54  F) 07/23/25 11:18   Pulse 75 07/23/25 11:18   Resp 14 07/23/25 11:18   SpO2 96 % 07/23/25 11:18   Vitals shown include unfiled device data.    Electronically Signed By: Beatriz Mack MD  July 23, 2025  11:20 AM

## 2025-07-28 LAB
PATH REPORT.COMMENTS IMP SPEC: NORMAL
PATH REPORT.COMMENTS IMP SPEC: NORMAL
PATH REPORT.FINAL DX SPEC: NORMAL
PATH REPORT.GROSS SPEC: NORMAL
PATH REPORT.MICROSCOPIC SPEC OTHER STN: NORMAL
PATH REPORT.RELEVANT HX SPEC: NORMAL
PHOTO IMAGE: NORMAL

## 2025-07-28 PROCEDURE — 88344 IMHCHEM/IMCYTCHM EA MLT ANTB: CPT | Mod: 26

## 2025-07-28 PROCEDURE — 88305 TISSUE EXAM BY PATHOLOGIST: CPT | Mod: 26

## (undated) DEVICE — SUCTION MANIFOLD NEPTUNE 2 SYS 4 PORT 0702-020-000

## (undated) DEVICE — FILTER ERBEJET ESM 2 DISP 077.0571

## (undated) DEVICE — SPECIMEN TRAP TISSUE CONTAINER PIRANHA 2208120

## (undated) DEVICE — BLADE MORCELLATOR WOLF PIRANHA 4.75X385MM 49700103

## (undated) DEVICE — CATH FOLEY 3WAY 22FR 30ML LATEX 0167SI22

## (undated) DEVICE — SYR 50ML CATH TIP W/O NDL 309620

## (undated) DEVICE — CATH LASER URETERAL 7.1FRX40CM G17797  022403-7.1-40

## (undated) DEVICE — SOL WATER IRRIG 1000ML BOTTLE 2F7114

## (undated) DEVICE — TUBING SET PIRANHA 41702208

## (undated) DEVICE — TUBING IRRIG TUR Y TYPE 96" LF 6543-01

## (undated) DEVICE — KIT TURNOVER FAIRVIEW SOUTHDALE HALF SP3890

## (undated) DEVICE — PACK TUR CUSTOM SBA15RUFSE

## (undated) DEVICE — BAG DRAIN URO FOR SIEMENS 8MM ADAPTER NS CC164NS-A

## (undated) DEVICE — DRAPE MAYO STAND 23X54 8337

## (undated) DEVICE — DRSG GAUZE 4X4" 3033

## (undated) DEVICE — BAG DECANTER STERILE WHITE DYNJDEC09

## (undated) DEVICE — FILTER PIRANHA DISP 2228.901

## (undated) DEVICE — GLOVE GAMMEX NEOPRENE ULTRA SZ 7.5 LF 8515

## (undated) DEVICE — CATH HOLDER STRAP 36600

## (undated) DEVICE — LASER FIBER HOLMIUM MOSES 550 D/F/L AC-10030120

## (undated) DEVICE — JELLY LUBRICATING SURGILUBE 4OZ TUBE

## (undated) DEVICE — LINEN TOWEL PACK X5 5464

## (undated) DEVICE — BAG URINARY DRAIN 4000ML LF 153509

## (undated) DEVICE — SOLUTION IV IRRIGATION 0.9% NACL 3L R8206

## (undated) DEVICE — TUBING SUCTION MEDI-VAC SOFT 3/16"X20' N520A

## (undated) DEVICE — Device

## (undated) RX ORDER — ONDANSETRON 2 MG/ML
INJECTION INTRAMUSCULAR; INTRAVENOUS
Status: DISPENSED
Start: 2025-07-23

## (undated) RX ORDER — GENTAMICIN SULFATE 80 MG/100ML
INJECTION, SOLUTION INTRAVENOUS
Status: DISPENSED
Start: 2025-07-23

## (undated) RX ORDER — CEFAZOLIN SODIUM/WATER 2 G/20 ML
SYRINGE (ML) INTRAVENOUS
Status: DISPENSED
Start: 2025-07-23

## (undated) RX ORDER — PROPOFOL 10 MG/ML
INJECTION, EMULSION INTRAVENOUS
Status: DISPENSED
Start: 2025-07-23

## (undated) RX ORDER — HYDROMORPHONE HYDROCHLORIDE 1 MG/ML
INJECTION, SOLUTION INTRAMUSCULAR; INTRAVENOUS; SUBCUTANEOUS
Status: DISPENSED
Start: 2025-07-23

## (undated) RX ORDER — IPRATROPIUM BROMIDE AND ALBUTEROL SULFATE 2.5; .5 MG/3ML; MG/3ML
SOLUTION RESPIRATORY (INHALATION)
Status: DISPENSED
Start: 2025-07-23

## (undated) RX ORDER — FENTANYL CITRATE 50 UG/ML
INJECTION, SOLUTION INTRAMUSCULAR; INTRAVENOUS
Status: DISPENSED
Start: 2025-07-23

## (undated) RX ORDER — TOLTERODINE TARTRATE 2 MG/1
TABLET, EXTENDED RELEASE ORAL
Status: DISPENSED
Start: 2025-07-23

## (undated) RX ORDER — ACETAMINOPHEN 325 MG/1
TABLET ORAL
Status: DISPENSED
Start: 2025-07-23

## (undated) RX ORDER — OXYBUTYNIN CHLORIDE 10 MG/1
TABLET, EXTENDED RELEASE ORAL
Status: DISPENSED
Start: 2025-07-23